# Patient Record
Sex: MALE | Race: WHITE | NOT HISPANIC OR LATINO | Employment: OTHER | URBAN - METROPOLITAN AREA
[De-identification: names, ages, dates, MRNs, and addresses within clinical notes are randomized per-mention and may not be internally consistent; named-entity substitution may affect disease eponyms.]

---

## 2023-01-05 ENCOUNTER — OFFICE VISIT (OUTPATIENT)
Dept: FAMILY MEDICINE | Facility: CLINIC | Age: 68
End: 2023-01-05
Payer: MEDICARE

## 2023-01-05 ENCOUNTER — LAB VISIT (OUTPATIENT)
Dept: LAB | Facility: HOSPITAL | Age: 68
End: 2023-01-05
Attending: FAMILY MEDICINE
Payer: MEDICARE

## 2023-01-05 VITALS
HEART RATE: 63 BPM | HEIGHT: 66 IN | DIASTOLIC BLOOD PRESSURE: 82 MMHG | TEMPERATURE: 98 F | SYSTOLIC BLOOD PRESSURE: 132 MMHG | OXYGEN SATURATION: 99 % | BODY MASS INDEX: 27.99 KG/M2 | WEIGHT: 174.19 LBS

## 2023-01-05 DIAGNOSIS — Z12.5 SCREENING PSA (PROSTATE SPECIFIC ANTIGEN): ICD-10-CM

## 2023-01-05 DIAGNOSIS — H60.92 OTITIS EXTERNA OF LEFT EAR, UNSPECIFIED CHRONICITY, UNSPECIFIED TYPE: ICD-10-CM

## 2023-01-05 DIAGNOSIS — H40.9 GLAUCOMA, UNSPECIFIED GLAUCOMA TYPE, UNSPECIFIED LATERALITY: ICD-10-CM

## 2023-01-05 DIAGNOSIS — Z13.6 ENCOUNTER FOR ABDOMINAL AORTIC ANEURYSM (AAA) SCREENING: ICD-10-CM

## 2023-01-05 DIAGNOSIS — M51.9 LUMBAR DISC DISEASE: ICD-10-CM

## 2023-01-05 DIAGNOSIS — N52.9 ERECTILE DYSFUNCTION, UNSPECIFIED ERECTILE DYSFUNCTION TYPE: ICD-10-CM

## 2023-01-05 DIAGNOSIS — Z98.890 HISTORY OF HAND SURGERY: ICD-10-CM

## 2023-01-05 DIAGNOSIS — F41.9 ANXIETY: ICD-10-CM

## 2023-01-05 DIAGNOSIS — I10 ESSENTIAL HYPERTENSION: Primary | ICD-10-CM

## 2023-01-05 DIAGNOSIS — L30.9 ECZEMA, UNSPECIFIED TYPE: ICD-10-CM

## 2023-01-05 DIAGNOSIS — E78.5 HYPERLIPIDEMIA, UNSPECIFIED HYPERLIPIDEMIA TYPE: ICD-10-CM

## 2023-01-05 DIAGNOSIS — I10 ESSENTIAL HYPERTENSION: ICD-10-CM

## 2023-01-05 LAB
ALBUMIN SERPL BCP-MCNC: 4.7 G/DL (ref 3.5–5.2)
ALP SERPL-CCNC: 38 U/L (ref 55–135)
ALT SERPL W/O P-5'-P-CCNC: 23 U/L (ref 10–44)
ANION GAP SERPL CALC-SCNC: 4 MMOL/L (ref 8–16)
AST SERPL-CCNC: 21 U/L (ref 10–40)
BASOPHILS # BLD AUTO: 0.03 K/UL (ref 0–0.2)
BASOPHILS NFR BLD: 0.6 % (ref 0–1.9)
BILIRUB SERPL-MCNC: 1.6 MG/DL (ref 0.1–1)
BUN SERPL-MCNC: 13 MG/DL (ref 8–23)
CALCIUM SERPL-MCNC: 9.2 MG/DL (ref 8.7–10.5)
CHLORIDE SERPL-SCNC: 105 MMOL/L (ref 95–110)
CHOLEST SERPL-MCNC: 258 MG/DL (ref 120–199)
CHOLEST/HDLC SERPL: 4.5 {RATIO} (ref 2–5)
CO2 SERPL-SCNC: 29 MMOL/L (ref 23–29)
COMPLEXED PSA SERPL-MCNC: 5.7 NG/ML (ref 0–4)
CREAT SERPL-MCNC: 0.8 MG/DL (ref 0.5–1.4)
DIFFERENTIAL METHOD: ABNORMAL
EOSINOPHIL # BLD AUTO: 0.1 K/UL (ref 0–0.5)
EOSINOPHIL NFR BLD: 2.2 % (ref 0–8)
ERYTHROCYTE [DISTWIDTH] IN BLOOD BY AUTOMATED COUNT: 12.3 % (ref 11.5–14.5)
EST. GFR  (NO RACE VARIABLE): >60 ML/MIN/1.73 M^2
GLUCOSE SERPL-MCNC: 101 MG/DL (ref 70–110)
HCT VFR BLD AUTO: 43.9 % (ref 40–54)
HDLC SERPL-MCNC: 57 MG/DL (ref 40–75)
HDLC SERPL: 22.1 % (ref 20–50)
HGB BLD-MCNC: 15 G/DL (ref 14–18)
IMM GRANULOCYTES # BLD AUTO: 0.02 K/UL (ref 0–0.04)
IMM GRANULOCYTES NFR BLD AUTO: 0.4 % (ref 0–0.5)
LDLC SERPL CALC-MCNC: 164.8 MG/DL (ref 63–159)
LYMPHOCYTES # BLD AUTO: 1.1 K/UL (ref 1–4.8)
LYMPHOCYTES NFR BLD: 21.1 % (ref 18–48)
MCH RBC QN AUTO: 30.9 PG (ref 27–31)
MCHC RBC AUTO-ENTMCNC: 34.2 G/DL (ref 32–36)
MCV RBC AUTO: 91 FL (ref 82–98)
MONOCYTES # BLD AUTO: 0.5 K/UL (ref 0.3–1)
MONOCYTES NFR BLD: 10.4 % (ref 4–15)
NEUTROPHILS # BLD AUTO: 3.3 K/UL (ref 1.8–7.7)
NEUTROPHILS NFR BLD: 65.3 % (ref 38–73)
NONHDLC SERPL-MCNC: 201 MG/DL
NRBC BLD-RTO: 0 /100 WBC
PLATELET # BLD AUTO: 199 K/UL (ref 150–450)
PMV BLD AUTO: 8.9 FL (ref 9.2–12.9)
POTASSIUM SERPL-SCNC: 4.5 MMOL/L (ref 3.5–5.1)
PROT SERPL-MCNC: 7.9 G/DL (ref 6–8.4)
RBC # BLD AUTO: 4.85 M/UL (ref 4.6–6.2)
SODIUM SERPL-SCNC: 138 MMOL/L (ref 136–145)
TRIGL SERPL-MCNC: 181 MG/DL (ref 30–150)
WBC # BLD AUTO: 5.02 K/UL (ref 3.9–12.7)

## 2023-01-05 PROCEDURE — G0008 ADMIN INFLUENZA VIRUS VAC: HCPCS | Mod: S$GLB,,, | Performed by: FAMILY MEDICINE

## 2023-01-05 PROCEDURE — 99204 OFFICE O/P NEW MOD 45 MIN: CPT | Mod: S$GLB,,, | Performed by: FAMILY MEDICINE

## 2023-01-05 PROCEDURE — 80061 LIPID PANEL: CPT | Performed by: FAMILY MEDICINE

## 2023-01-05 PROCEDURE — 90694 VACC AIIV4 NO PRSRV 0.5ML IM: CPT | Mod: S$GLB,,, | Performed by: FAMILY MEDICINE

## 2023-01-05 PROCEDURE — 85025 COMPLETE CBC W/AUTO DIFF WBC: CPT | Performed by: FAMILY MEDICINE

## 2023-01-05 PROCEDURE — 36415 COLL VENOUS BLD VENIPUNCTURE: CPT | Performed by: FAMILY MEDICINE

## 2023-01-05 PROCEDURE — 90694 FLU VACCINE - QUADRIVALENT - ADJUVANTED: ICD-10-PCS | Mod: S$GLB,,, | Performed by: FAMILY MEDICINE

## 2023-01-05 PROCEDURE — G0008 FLU VACCINE - QUADRIVALENT - ADJUVANTED: ICD-10-PCS | Mod: S$GLB,,, | Performed by: FAMILY MEDICINE

## 2023-01-05 PROCEDURE — 84153 ASSAY OF PSA TOTAL: CPT | Performed by: FAMILY MEDICINE

## 2023-01-05 PROCEDURE — 99204 PR OFFICE/OUTPT VISIT, NEW, LEVL IV, 45-59 MIN: ICD-10-PCS | Mod: S$GLB,,, | Performed by: FAMILY MEDICINE

## 2023-01-05 PROCEDURE — 80053 COMPREHEN METABOLIC PANEL: CPT | Performed by: FAMILY MEDICINE

## 2023-01-05 RX ORDER — LATANOPROST 50 UG/ML
1 SOLUTION/ DROPS OPHTHALMIC NIGHTLY
COMMUNITY
Start: 2022-12-30

## 2023-01-05 RX ORDER — ALPRAZOLAM 0.25 MG/1
TABLET ORAL
COMMUNITY
End: 2023-02-24 | Stop reason: SDUPTHER

## 2023-01-05 RX ORDER — LISINOPRIL 40 MG/1
40 TABLET ORAL DAILY
Qty: 90 TABLET | Refills: 1 | Status: SHIPPED | OUTPATIENT
Start: 2023-01-05 | End: 2023-06-06 | Stop reason: SDUPTHER

## 2023-01-05 RX ORDER — NEOMYCIN SULFATE, POLYMYXIN B SULFATE AND HYDROCORTISONE 10; 3.5; 1 MG/ML; MG/ML; [USP'U]/ML
3 SUSPENSION/ DROPS AURICULAR (OTIC) 4 TIMES DAILY
Qty: 10 ML | Refills: 0 | Status: ON HOLD | OUTPATIENT
Start: 2023-01-05 | End: 2023-04-25 | Stop reason: CLARIF

## 2023-01-05 RX ORDER — LISINOPRIL 40 MG/1
40 TABLET ORAL
COMMUNITY
Start: 2022-10-13 | End: 2023-01-05 | Stop reason: SDUPTHER

## 2023-01-05 RX ORDER — TRIAMCINOLONE ACETONIDE 1 MG/G
CREAM TOPICAL
COMMUNITY

## 2023-01-06 NOTE — PROGRESS NOTES
Subjective:       Patient ID: Phil Verduzco is a 67 y.o. male.    Chief Complaint: Establish Care and Annual Exam    Moved here from [place] in April.  First medical care here.      Social history quit smoking in 1994.  Alcohol 1/2 bottle wine per night.  Exercises 4 times a week.  Has worked at multiple different jobs in the past.      Family history father drowned at age 21.  Brother has osteoporosis.  Mother had cerebral aneurysm and stroke.  Several female family members in the family have had cerebral aneurysms.  None of the males.  Grandfather with hypertension.    Past medical history.  Hypertension controlled with lisinopril needs refill.  Erectile dysfunction p.r.n. tadalafil.  Glaucoma on eyedrops for this.  Some eczema for which she uses some triamcinolone cream p.r.n..  Anxiety issues uses p.r.n. Xanax for flying or scuba diving.  History of some hyperlipidemia.  Had right hand surgery on his thumb.  Has lumbar disc disease.    Immunizations due for flu shot high-dose.  Needs Prevnar 20.  He would like to research this before he gets it.      Review of Systems   Constitutional: Negative.    HENT:  Positive for ear pain.    Eyes: Negative.    Respiratory: Negative.     Cardiovascular: Negative.    Gastrointestinal: Negative.    Endocrine: Negative.    Genitourinary: Negative.    Musculoskeletal:  Positive for back pain.   Skin: Negative.    Neurological: Negative.    Hematological: Negative.    Psychiatric/Behavioral: Negative.       Objective:      Physical Exam  Vitals reviewed.   Constitutional:       Appearance: Normal appearance. He is well-developed and normal weight.   HENT:      Head: Normocephalic and atraumatic.      Right Ear: Tympanic membrane normal.      Left Ear: Tympanic membrane normal.      Ears:      Comments: Left ear canal is somewhat reddened.  Painful.     Nose: Nose normal.      Mouth/Throat:      Mouth: Mucous membranes are moist.      Pharynx: No oropharyngeal exudate.   Eyes:       Conjunctiva/sclera: Conjunctivae normal.      Pupils: Pupils are equal, round, and reactive to light.   Neck:      Vascular: No carotid bruit.   Cardiovascular:      Rate and Rhythm: Normal rate and regular rhythm.      Pulses: Normal pulses.      Heart sounds: Normal heart sounds. No murmur heard.    No gallop.   Pulmonary:      Effort: Pulmonary effort is normal.      Breath sounds: Normal breath sounds.   Abdominal:      General: Bowel sounds are normal.      Palpations: Abdomen is soft. There is no mass.      Tenderness: There is no abdominal tenderness.   Musculoskeletal:         General: Normal range of motion.      Cervical back: Normal range of motion.   Skin:     General: Skin is warm and dry.   Neurological:      General: No focal deficit present.      Mental Status: He is alert and oriented to person, place, and time.   Psychiatric:         Mood and Affect: Mood normal.         Behavior: Behavior normal.       Assessment:       1. Essential hypertension    2. Hyperlipidemia, unspecified hyperlipidemia type    3. Screening PSA (prostate specific antigen)    4. Encounter for abdominal aortic aneurysm (AAA) screening    5. Erectile dysfunction, unspecified erectile dysfunction type    6. Glaucoma, unspecified glaucoma type, unspecified laterality    7. Eczema, unspecified type    8. Anxiety    9. Lumbar disc disease    10. Otitis externa of left ear, unspecified chronicity, unspecified type    11. History of hand surgery          Plan:       Essential hypertension  -     CBC Auto Differential; Future; Expected date: 01/05/2023  -     Comprehensive Metabolic Panel; Future; Expected date: 01/05/2023    Hyperlipidemia, unspecified hyperlipidemia type  -     Lipid Panel; Future; Expected date: 01/05/2023    Screening PSA (prostate specific antigen)  -     PSA, Screening; Future; Expected date: 01/05/2023    Encounter for abdominal aortic aneurysm (AAA) screening  -      AAA Screening; Future; Expected  date: 01/05/2023    Erectile dysfunction, unspecified erectile dysfunction type    Glaucoma, unspecified glaucoma type, unspecified laterality    Eczema, unspecified type    Anxiety    Lumbar disc disease    Otitis externa of left ear, unspecified chronicity, unspecified type    History of hand surgery    Other orders  -     Influenza (FLUAD) - Quadrivalent (Adjuvanted) *Preferred* (65+) (PF)  -     neomycin-polymyxin-hydrocortisone (CORTISPORIN) 3.5-10,000-1 mg/mL-unit/mL-% otic suspension; Place 3 drops into the left ear 4 (four) times daily.  Dispense: 10 mL; Refill: 0  -     lisinopriL (PRINIVIL,ZESTRIL) 40 MG tablet; Take 1 tablet (40 mg total) by mouth once daily.  Dispense: 90 tablet; Refill: 1    High-dose flu vaccine today.  Consider Prevnar 20.  Refill lisinopril 40 mg daily.  Abdominal aortic aneurysm screening is ordered due to his history of prior smoking.  Colonoscopy recommended it is probably due he did have 1 in [place] but sounds like he was quite sometime ago.  CBC CMP lipids PSA ordered.    In addition to routine exam.  Having left ear pain.    Physical examination.  Left otitis externa.  Somewhat reddened not really tender.    Impression.  Left otitis externa.      Plan Cortisporin otic suspension.  Three drops q.i.d. to the left ear.

## 2023-01-09 ENCOUNTER — TELEPHONE (OUTPATIENT)
Dept: FAMILY MEDICINE | Facility: CLINIC | Age: 68
End: 2023-01-09
Payer: MEDICARE

## 2023-01-09 NOTE — TELEPHONE ENCOUNTER
----- Message from Carmelo Blanc sent at 1/9/2023  3:37 PM CST -----  Type: Needs Medical Advice  Who Called: Pt   Symptoms (please be specific):   How long has patient had these symptoms:    Pharmacy name and phone #:    Best Call Back Number: [phone]  Additional Information: Pt requesting a call back concerning why he has to do a U/S, pt states he does not recall speaking to Dr Gould about that.Also about records from [facility] in [place].

## 2023-01-10 ENCOUNTER — PATIENT MESSAGE (OUTPATIENT)
Dept: ADMINISTRATIVE | Facility: HOSPITAL | Age: 68
End: 2023-01-10
Payer: MEDICARE

## 2023-01-25 ENCOUNTER — HOSPITAL ENCOUNTER (OUTPATIENT)
Dept: RADIOLOGY | Facility: HOSPITAL | Age: 68
Discharge: HOME | End: 2023-01-25
Attending: FAMILY MEDICINE
Payer: MEDICARE

## 2023-01-25 DIAGNOSIS — Z13.6 ENCOUNTER FOR ABDOMINAL AORTIC ANEURYSM (AAA) SCREENING: ICD-10-CM

## 2023-01-25 PROCEDURE — 76706 US ABDL AORTA SCREEN AAA: CPT | Mod: TC,PO

## 2023-01-27 ENCOUNTER — TELEPHONE (OUTPATIENT)
Dept: FAMILY MEDICINE | Facility: CLINIC | Age: 68
End: 2023-01-27
Payer: MEDICARE

## 2023-01-27 NOTE — TELEPHONE ENCOUNTER
----- Message from Carmelo Blanc sent at 1/27/2023  9:35 AM CST -----  Type: Needs Medical Advice  Who Called: Pt   Symptoms (please be specific):   How long has patient had these symptoms:    Pharmacy name and phone #:    Best Call Back Number: [phone]  Additional Information: Pt requesting a call back concerning if records from [facility] was received in Dr Gould office.[facility] is located in [place], [place].

## 2023-01-31 ENCOUNTER — TELEPHONE (OUTPATIENT)
Dept: FAMILY MEDICINE | Facility: CLINIC | Age: 68
End: 2023-01-31
Payer: MEDICARE

## 2023-02-02 ENCOUNTER — TELEPHONE (OUTPATIENT)
Dept: FAMILY MEDICINE | Facility: CLINIC | Age: 68
End: 2023-02-02
Payer: MEDICARE

## 2023-02-02 NOTE — TELEPHONE ENCOUNTER
----- Message from Bart Taveras sent at 2/2/2023  3:46 PM CST -----  Type: Needs Medical Advice  Who Called:  Pt    Best Call Back Number: [phone]      Additional Information: Sts that they were able to link his mychart with his mychart with [place] since the records did not get transferred.  Hopes that Dr ortiz can access them that way to get up to speed with his history to better help him.  Please advise -- Thank you

## 2023-02-07 ENCOUNTER — TELEPHONE (OUTPATIENT)
Dept: FAMILY MEDICINE | Facility: CLINIC | Age: 68
End: 2023-02-07
Payer: MEDICARE

## 2023-02-15 ENCOUNTER — TELEPHONE (OUTPATIENT)
Dept: FAMILY MEDICINE | Facility: CLINIC | Age: 68
End: 2023-02-15
Payer: MEDICARE

## 2023-02-16 NOTE — TELEPHONE ENCOUNTER
Returned call to pt , states he was on vacation a week ago had sinus col got better then had diarrhea when he got home all clear now home test was neg for covid . He also stated once last week he had little blood in semen hasnt happened again. Told pt keep his apt next week if any symp or problem arise call and we will get him in or after hours urgent care or er.

## 2023-02-16 NOTE — TELEPHONE ENCOUNTER
----- Message from Carlitos Forrest sent at 2/15/2023  3:28 PM CST -----  Regarding: qustion  Type: Needs Medical Advice    Who Called:  Phil    B[facility]ba Call Back Number: [phone]    Additional Information: pt was ill last night wants to know if needs to quarantine but did not take home test. Pt had incident while on vacation. Needs to know if needs to be seen sooner than upcoming appt.

## 2023-02-24 ENCOUNTER — OFFICE VISIT (OUTPATIENT)
Dept: FAMILY MEDICINE | Facility: CLINIC | Age: 68
End: 2023-02-24
Payer: MEDICARE

## 2023-02-24 VITALS
HEART RATE: 79 BPM | HEIGHT: 66 IN | SYSTOLIC BLOOD PRESSURE: 120 MMHG | DIASTOLIC BLOOD PRESSURE: 64 MMHG | TEMPERATURE: 98 F | OXYGEN SATURATION: 97 % | WEIGHT: 176.63 LBS | BODY MASS INDEX: 28.39 KG/M2

## 2023-02-24 DIAGNOSIS — R36.1 HEMATOSPERMIA: ICD-10-CM

## 2023-02-24 DIAGNOSIS — F41.9 ANXIETY: ICD-10-CM

## 2023-02-24 DIAGNOSIS — Z77.090 ASBESTOS EXPOSURE: ICD-10-CM

## 2023-02-24 DIAGNOSIS — J92.9 PLEURAL PLAQUE: ICD-10-CM

## 2023-02-24 DIAGNOSIS — Z12.11 COLON CANCER SCREENING: ICD-10-CM

## 2023-02-24 DIAGNOSIS — N41.9 PROSTATITIS, UNSPECIFIED PROSTATITIS TYPE: ICD-10-CM

## 2023-02-24 DIAGNOSIS — M54.9 BACK PAIN, UNSPECIFIED BACK LOCATION, UNSPECIFIED BACK PAIN LATERALITY, UNSPECIFIED CHRONICITY: Primary | ICD-10-CM

## 2023-02-24 DIAGNOSIS — R97.20 ELEVATED PSA: ICD-10-CM

## 2023-02-24 LAB
BILIRUBIN, UA POC OHS: NEGATIVE
BLOOD, UA POC OHS: ABNORMAL
CLARITY, UA POC OHS: CLEAR
COLOR, UA POC OHS: YELLOW
GLUCOSE, UA POC OHS: NEGATIVE
KETONES, UA POC OHS: NEGATIVE
LEUKOCYTES, UA POC OHS: NEGATIVE
NITRITE, UA POC OHS: NEGATIVE
PH, UA POC OHS: 6.5
PROTEIN, UA POC OHS: NEGATIVE
SPECIFIC GRAVITY, UA POC OHS: 1.01
UROBILINOGEN, UA POC OHS: 0.2

## 2023-02-24 PROCEDURE — 81003 URINALYSIS AUTO W/O SCOPE: CPT | Mod: QW,S$GLB,, | Performed by: FAMILY MEDICINE

## 2023-02-24 PROCEDURE — 99214 PR OFFICE/OUTPT VISIT, EST, LEVL IV, 30-39 MIN: ICD-10-PCS | Mod: S$GLB,,, | Performed by: FAMILY MEDICINE

## 2023-02-24 PROCEDURE — 99214 OFFICE O/P EST MOD 30 MIN: CPT | Mod: S$GLB,,, | Performed by: FAMILY MEDICINE

## 2023-02-24 PROCEDURE — 81003 POCT URINALYSIS(INSTRUMENT): ICD-10-PCS | Mod: QW,S$GLB,, | Performed by: FAMILY MEDICINE

## 2023-02-24 RX ORDER — CIPROFLOXACIN 500 MG/1
500 TABLET ORAL 2 TIMES DAILY
Qty: 30 TABLET | Refills: 0 | Status: ON HOLD | OUTPATIENT
Start: 2023-02-24 | End: 2023-04-25 | Stop reason: CLARIF

## 2023-02-24 RX ORDER — ALPRAZOLAM 0.5 MG/1
0.5 TABLET ORAL DAILY PRN
Qty: 30 TABLET | Refills: 0 | Status: SHIPPED | OUTPATIENT
Start: 2023-02-24

## 2023-02-24 NOTE — PATIENT INSTRUCTIONS
UROLOGY   Thad Patricio MD  [address] [place] [address] DR  [address]  [place] [address]  Phone: [phone]  Fax: [phone]    GASTRO FOR COLONOSCOPY   Gerard Allen MD  [address] [facility]  [address]  [place] [address]  Phone: [phone]  Fax: [phone]    Ct CHEST - [facility] WILL CALL YOU TO SCHEDULE

## 2023-02-26 PROBLEM — Z77.090 ASBESTOS EXPOSURE: Status: ACTIVE | Noted: 2023-02-26

## 2023-02-27 ENCOUNTER — TELEPHONE (OUTPATIENT)
Dept: GASTROENTEROLOGY | Facility: CLINIC | Age: 68
End: 2023-02-27
Payer: MEDICARE

## 2023-02-27 ENCOUNTER — TELEPHONE (OUTPATIENT)
Dept: FAMILY MEDICINE | Facility: CLINIC | Age: 68
End: 2023-02-27
Payer: MEDICARE

## 2023-02-27 DIAGNOSIS — Z86.010 HISTORY OF COLON POLYPS: Primary | ICD-10-CM

## 2023-02-27 NOTE — PROGRESS NOTES
Subjective:       Patient ID: Phil Verduzco is a 67 y.o. male.    Chief Complaint: Urinary Tract Infection    Worried about urinary tract infection.  Had some blood in the semen about 2 weeks ago.  Slight pressure and testicles.  No dysuria no frequency.  Nocturia 2 times per night.  PSA 5.7 previously 3.73 and 4.43.  No fever chills.  Urinalysis shows trace lysed blood only.  Also concern over prior asbestos exposure.  History of a pleural plaque.  He had fallen previously and had a rib fracture.  Plaque was found.  Had a CT scan March 30, 2017.  Reviewed the old doctor's note from that that we could not see the CT report and he was supposed heavy repeat CT in 1 year this was never done.  He has no shortness of breath.  Elevated PSA so will need to see urology.  He is also due for colon cancer screening.  Anxiety issues.  Needs Xanax p.r.n. for flying.  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     Physical examination.  Vital signs are noted.  No acute distress.  Neck without bruit no adenopathy.  Chest clear.  Heart regular rate and rhythm.  Abdomen bowel sounds are positive soft nontender no hepatosplenomegaly no CVA tenderness.   testes descended nontender no masses.  No hernia.  Rectal exam prostate is smooth soft not really tender but is somewhat boggy.      Objective:        Assessment:       1. Back pain, unspecified back location, unspecified back pain laterality, unspecified chronicity    2. Hematospermia    3. Prostatitis, unspecified prostatitis type    4. Asbestos exposure    5. Elevated PSA    6. Colon cancer screening    7. Anxiety    8. Pleural plaque          Plan:       Back pain, unspecified back location, unspecified back pain laterality, unspecified chronicity  -     POCT Urinalysis(Instrument)    Hematospermia    Prostatitis, unspecified prostatitis type  -     Ambulatory referral/consult to Urology; Future; Expected date: 03/03/2023    Asbestos exposure  -     CT Chest Without Contrast; Future; Expected  date: 02/24/2023    Elevated PSA  -     Ambulatory referral/consult to Urology; Future; Expected date: 03/03/2023    Colon cancer screening  -     Cancel: Ambulatory referral/consult to Gastroenterology; Future; Expected date: 03/03/2023  -     Ambulatory referral/consult to Gastroenterology; Future; Expected date: 03/03/2023    Anxiety    Pleural plaque    Other orders  -     ciprofloxacin HCl (CIPRO) 500 MG tablet; Take 1 tablet (500 mg total) by mouth 2 (two) times daily.  Dispense: 30 tablet; Refill: 0  -     ALPRAZolam (XANAX) 0.5 MG tablet; Take 1 tablet (0.5 mg total) by mouth daily as needed for Anxiety.  Dispense: 30 tablet; Refill: 0    Treat him with antibiotics for prostatitis.  Refer him to Urology Dr. Patricio.  Cipro 500 mg b.i.d..  CT chest without contrast due to the asbestos in the pleural plaques.  Refer him for colonoscopy.  Xanax 0.5 number 30 daily maximum p.r.n. for flying.

## 2023-02-28 ENCOUNTER — PATIENT MESSAGE (OUTPATIENT)
Dept: FAMILY MEDICINE | Facility: CLINIC | Age: 68
End: 2023-02-28
Payer: MEDICARE

## 2023-03-01 ENCOUNTER — HOSPITAL ENCOUNTER (OUTPATIENT)
Dept: RADIOLOGY | Facility: HOSPITAL | Age: 68
Discharge: HOME | End: 2023-03-01
Attending: FAMILY MEDICINE
Payer: MEDICARE

## 2023-03-01 DIAGNOSIS — Z77.090 ASBESTOS EXPOSURE: ICD-10-CM

## 2023-03-01 PROCEDURE — 71250 CT THORAX DX C-: CPT | Mod: TC,PO

## 2023-03-02 ENCOUNTER — TELEPHONE (OUTPATIENT)
Dept: FAMILY MEDICINE | Facility: CLINIC | Age: 68
End: 2023-03-02
Payer: MEDICARE

## 2023-03-02 NOTE — TELEPHONE ENCOUNTER
We received Imaging results xray chest, ct chest with contrast,and ct head cervical spine without contrast.

## 2023-03-03 ENCOUNTER — PATIENT MESSAGE (OUTPATIENT)
Dept: FAMILY MEDICINE | Facility: CLINIC | Age: 68
End: 2023-03-03
Payer: MEDICARE

## 2023-03-03 NOTE — TELEPHONE ENCOUNTER
The comparison shows no worrisome changes.  As Dr. Gould stated on his most recent imaging there is nothing that is suspicious for cancer. Follow up as recommended.

## 2023-03-16 ENCOUNTER — TELEPHONE (OUTPATIENT)
Dept: UROLOGY | Facility: CLINIC | Age: 68
End: 2023-03-16
Payer: MEDICARE

## 2023-03-16 NOTE — TELEPHONE ENCOUNTER
Pre appt call  Inquired about pts past urological care pt voiced sen urologist but was in [place]   Per pt linked in care everywhere per pt does not remember MD name   Confirmed appt time and reminded will need urine upon arrival for appt   Pt vu

## 2023-03-17 ENCOUNTER — OFFICE VISIT (OUTPATIENT)
Dept: UROLOGY | Facility: CLINIC | Age: 68
End: 2023-03-17
Payer: MEDICARE

## 2023-03-17 VITALS
SYSTOLIC BLOOD PRESSURE: 139 MMHG | BODY MASS INDEX: 28.5 KG/M2 | HEIGHT: 66 IN | RESPIRATION RATE: 18 BRPM | HEART RATE: 93 BPM | DIASTOLIC BLOOD PRESSURE: 77 MMHG

## 2023-03-17 DIAGNOSIS — R97.20 ELEVATED PSA: Primary | ICD-10-CM

## 2023-03-17 DIAGNOSIS — R31.29 MICROHEMATURIA: ICD-10-CM

## 2023-03-17 DIAGNOSIS — N41.9 PROSTATITIS, UNSPECIFIED PROSTATITIS TYPE: ICD-10-CM

## 2023-03-17 LAB
BACTERIA #/AREA URNS HPF: ABNORMAL /HPF
BACTERIA #/AREA URNS HPF: ABNORMAL /HPF
BILIRUB UR QL STRIP: NEGATIVE
BILIRUBIN, UA POC OHS: NEGATIVE
BLOOD, UA POC OHS: ABNORMAL
CLARITY UR: ABNORMAL
CLARITY, UA POC OHS: CLEAR
COLOR UR: YELLOW
COLOR, UA POC OHS: YELLOW
GLUCOSE UR QL STRIP: NEGATIVE
GLUCOSE, UA POC OHS: NEGATIVE
HGB UR QL STRIP: ABNORMAL
KETONES UR QL STRIP: NEGATIVE
KETONES, UA POC OHS: NEGATIVE
LEUKOCYTE ESTERASE UR QL STRIP: NEGATIVE
LEUKOCYTES, UA POC OHS: NEGATIVE
MICROSCOPIC COMMENT: ABNORMAL
MICROSCOPIC COMMENT: ABNORMAL
NITRITE UR QL STRIP: NEGATIVE
NITRITE, UA POC OHS: NEGATIVE
PH UR STRIP: 5 [PH] (ref 5–8)
PH, UA POC OHS: 5.5
POC RESIDUAL URINE VOLUME: 3 ML (ref 0–100)
PROT UR QL STRIP: NEGATIVE
PROTEIN, UA POC OHS: NEGATIVE
RBC #/AREA URNS HPF: 4 /HPF (ref 0–4)
RBC #/AREA URNS HPF: 4 /HPF (ref 0–4)
SP GR UR STRIP: 1.01 (ref 1–1.03)
SPECIFIC GRAVITY, UA POC OHS: 1.02
URN SPEC COLLECT METH UR: ABNORMAL
UROBILINOGEN UR STRIP-ACNC: NEGATIVE EU/DL
UROBILINOGEN, UA POC OHS: 0.2

## 2023-03-17 PROCEDURE — 99999 PR PBB SHADOW E&M-EST. PATIENT-LVL IV: CPT | Mod: PBBFAC,,, | Performed by: UROLOGY

## 2023-03-17 PROCEDURE — 87086 URINE CULTURE/COLONY COUNT: CPT | Performed by: UROLOGY

## 2023-03-17 PROCEDURE — 99214 OFFICE O/P EST MOD 30 MIN: CPT | Mod: PBBFAC,PN | Performed by: UROLOGY

## 2023-03-17 PROCEDURE — 99999 PR PBB SHADOW E&M-EST. PATIENT-LVL IV: ICD-10-PCS | Mod: PBBFAC,,, | Performed by: UROLOGY

## 2023-03-17 PROCEDURE — 81003 URINALYSIS AUTO W/O SCOPE: CPT | Performed by: UROLOGY

## 2023-03-17 PROCEDURE — 51798 US URINE CAPACITY MEASURE: CPT | Mod: PBBFAC,PN | Performed by: UROLOGY

## 2023-03-17 PROCEDURE — 81003 URINALYSIS AUTO W/O SCOPE: CPT | Mod: PBBFAC,PN | Performed by: UROLOGY

## 2023-03-17 PROCEDURE — 99205 PR OFFICE/OUTPT VISIT, NEW, LEVL V, 60-74 MIN: ICD-10-PCS | Mod: S$PBB,,, | Performed by: UROLOGY

## 2023-03-17 PROCEDURE — 99205 OFFICE O/P NEW HI 60 MIN: CPT | Mod: S$PBB,,, | Performed by: UROLOGY

## 2023-03-17 PROCEDURE — 81000 URINALYSIS NONAUTO W/SCOPE: CPT | Mod: 91 | Performed by: UROLOGY

## 2023-03-17 RX ORDER — CIPROFLOXACIN 500 MG/1
500 TABLET ORAL 2 TIMES DAILY
Qty: 6 TABLET | Refills: 0 | Status: SHIPPED | OUTPATIENT
Start: 2023-03-17 | End: 2023-06-07

## 2023-03-17 NOTE — PROGRESS NOTES
[facility] Urology New Patient/H&P:     Phil Verduzco is a 67 y.o. male who presents For evaluation of prostatitis elevated psa    2/24/23 [facility]  Worried about urinary tract infection.  Had some blood in the semen about 2 weeks ago.  Slight pressure and testicles.  No dysuria no frequency.  Nocturia 2 times per night.  PSA 5.7 previously 3.73 and 4.43.  No fever chills.  Urinalysis shows trace lysed blood only.  Also concern over prior asbestos exposure.  History of a pleural plaque.  He had fallen previously and had a rib fracture.  Plaque was found.  Had a CT scan March 30, 2017.  Reviewed the old doctor's note from that that we could not see the CT report and he was supposed heavy repeat CT in 1 year this was never done.  He has no shortness of breath.  Elevated PSA so will need to see urology.  He is also due for colon cancer screening.  Anxiety issues.  Needs Xanax p.r.n. for flying. Has hepatic cyst.   Physical examination noted  testes descended nontender no masses.  No hernia.  Rectal exam prostate is smooth soft not really tender but is somewhat boggy.  Given 15 day course cipro.    He presents today noting  [place] 3 yrs ago last saw urology he thinks. Not sure who. May have been about psa. Linked his mychart to [place] [facility]. CE review notes only pcp and GI visits.   Care Every where psa review however notes prior elevations    Component 06/09/2021 06/09/2021 08/11/2020 01/03/2017          PSA 3.73 -- 4.43 High     3.23   PSA, Free -- 0.49 -- --   PSA, Free Pct -- 13.1 Low     -- --     PSA on file here was 5.7 on 1/7/23 which was about 1 month before his hematospermia and visit above and was aysmp  He reports that the blood in his semen was painless, lasted 1 day, resolved on its own.  Denies blood in urine  No blood thinners.  Nonsmoker.  Quit 1994  No known family history of  malignancy  Minimal to no bothersome obstructive lower urinary tract symptoms.  Only assigned 2. Some AUA symptom score  "items, with narrative on others noting that incomplete emptying is only sometimes at night, frequency is only after hydrating and 2 coffees in the morning, intermittency is only once at night.  He notes 0/5 for urgency, weak stream and straining.  He is only happened a public restrooms.  He has nocturia x1, and does drink when he gets up at night to urinate.   cipro x15 days. Just finished 5 days ago.  Denies testicular ejaculatory perineal perirectal pain or pressure at time of evaluation and starting antibiotics.  No change since taking them.  Relatively asymptomatic.  Urinalysis dipstick today has moderate blood.  PVR by bladder scan is 3 cc.      Past Medical History:   Diagnosis Date    Hypertension     Mixed hyperlipidemia        Past Surgical History:   Procedure Laterality Date    right hand surgery         No family history on file.    Social History     Socioeconomic History    Marital status:    Tobacco Use    Smoking status: Former     Types: Cigarettes     Quit date:      Years since quittin.2    Smokeless tobacco: Former     Types: Chew     Quit date:        Review of patient's allergies indicates:  No Known Allergies    Medications Reviewed: see MAR    Focused Physical Exam    Vitals:    23 1044   BP: 139/77   Pulse: 93   Resp: 18     Body mass index is 28.5 kg/m².   Height: 5' 6" (167.6 cm)       Abdomen: Soft, non-tender, nondistended, no CVA tenderness  :  normal phallus without plaques/lesions, orthotopic urethral meatus, bilaterally desc testes in normal scrotum without mass or lesion  MARKO: normal sphincter tone, no masses, no hemmorrhoids   PROSTATE: 35g, no nodules, non-tender, nonboggy though questionably firmer on left than right which may have accounted for concern of soft consistency of right lobe.       LABS:      Recent Results (from the past 336 hour(s))   POCT Urinalysis(Instrument)    Collection Time: 23 10:56 AM   Result Value Ref Range    Color, " POC UA Yellow Yellow, Straw, Colorless    Clarity, POC UA Clear Clear    Glucose, POC UA Negative Negative    Bilirubin, POC UA Negative Negative    Ketones, POC UA Negative Negative    Spec Grav POC UA 1.020 1.005 - 1.030    Blood, POC UA Moderate (A) Negative    pH, POC UA 5.5 5.0 - 8.0    Protein, POC UA Negative Negative    Urobilinogen, POC UA 0.2 <=1.0    Nitrite, POC UA Negative Negative    WBC, POC UA Negative Negative   POCT Bladder Scan    Collection Time: 03/17/23 10:56 AM   Result Value Ref Range    POC Residual Urine Volume 3 0 - 100 mL         Assessment/Diagnosis:    1. Elevated PSA  Ambulatory referral/consult to Urology    PSA, Total and Free    Creatinine, Serum    MRI Prostate W W/O Contrast    Case Request Operating Room: BIOPSY, PROSTATE      2. Prostatitis, unspecified prostatitis type  Ambulatory referral/consult to Urology    POCT Urinalysis(Instrument)    POCT Bladder Scan    Urinalysis    Urinalysis Microscopic    Urine culture    Case Request Operating Room: BIOPSY, PROSTATE      3. Microhematuria            Plans:  We did review that hematospermia is often benign and self-limiting.  May have been related to prostate inflammation.  However in 2% or less of cases can be indication of prostate cancer.  We did review his history of elevated PSA as well as PSA elevation prior to any hematospermia concerns for prostatitis which he may or may not have had.  He has completed a course of antibiotics, based on blood in semen, questionably boggy prostate on exam, and vague symptoms.  He largely denies the symptoms today except for the self-limiting hematospermia in the absence of hematuria.    As well, his PSA elevation 1 month prior to that episode, is not the only PSA elevation, and on review of labs in Care everywhere has had PSA elevation in August of 2020 greater than 4, and though recheck the following year was less than 4, free PSA was still quite low at 12%.    I had a long discussion with  the patient regarding the natural history of cancer in men as well as when diagnostics are indicated. We also discussed differential for elevated psa which also includes benign enlargement and prostatitis.  We did discuss that an elevated PSA is considered a PSA greater than 4 because statistically 20% of people in this value range are found to have prostate cancer, however we also discussed a bit about PSA velocity and trends and age specific psa elevations. Given his true elevations, I therefore recommended prostate biopsy to evaluate for underlying malignancy.  We discussed biopsy and in detail, including 1% risk of infectious complications including sepsis but that it is an otherwise safe diagnostic procedure with expected hematuria hematospermia after.      I went over the details of a transrectal ultrasound-guided biopsy of the prostate, and described the technique in detail.   The patient will be given local injection anesthetic to block the prostate so as to minimize any pain. 12-14 biopsy specimens will be taken. These will be sent for histopathology analysis.   Complications include bleeding, fever and chills. He was also instructed to watch for any signs of fever. If he does have any fever or chills after, he was advised to come to the emergency room right away for intravenous antibiotics and possible admission to the hospital. He is to refrain from any strenuous activity including sexual activity for the next 72 hours after biopsy. He was also advised that he may have blood while urinating, during bowel movements as well as during ejaculations. He was given a prebiopsy/postbiopsy instruction sheet was reminding him to avoid aspirin and blood thinners for 7 days prior, take the Rxed antibiotics the day before, day of, day after biopsy, and perform a fleet enema at home morning of biopsy. All questions he had were answered in detail.      As well, prior to proceeding with prostate biopsy, will recheck PSA  with free and total PSA at least a few weeks after completion of his cipro as well as add free PSA to help further assess risk.  Discuss the significance of free PSA in helping to risk stratify concern for underlying disease.  We also had risk benefit discussion about MRI of the prostate noting it is recommended prior to proceeding with biopsy to determine if there are any clinically significant index lesions, which could then be targeted on MRI.  Discussed briefly cognitive versus MRI fusion.  We also reviewed that a negative MRI does not rule out disease, and biopsy still indicated.     We did also review his recent trace blood on urine dipstick at primary Care, and moderate blood on urine dipstick today.  We did discuss that micro hematuria is greater than 5 red blood cells per high-power field on urinalysis microscopic exam, which will sent today as well as culture.  Certainly if true micro hematuria present, can add on cystoscopy at time of prostate biopsy and briefly discuss this procedure, as well as get upper tract imaging prior.  Will chart check urine results to determine if this workup is necessary, otherwise     TRUS/bx scheduled at [facility] on 4/25/23  MRI of the prostate with PSA free and total and creatinine in the next 2-3 weeks.        Total time spent in/on encounter today, including face to face time with patient, counseling, medical record review, interpretation of tests/results, , and treatment plan coordination: 70 minutes

## 2023-03-19 LAB — BACTERIA UR CULT: NO GROWTH

## 2023-03-21 ENCOUNTER — TELEPHONE (OUTPATIENT)
Dept: FAMILY MEDICINE | Facility: CLINIC | Age: 68
End: 2023-03-21
Payer: MEDICARE

## 2023-03-21 NOTE — TELEPHONE ENCOUNTER
----- Message from Carlitos Forrest sent at 3/21/2023 10:41 AM CDT -----  Regarding: f/u on portal msg  Type: Needs Medical Advice    Who Called:  Phil Mac Call Back Number: [phone]    Additional Information: please call pt abt question in portal msg dated 3/19 and 3/20

## 2023-03-23 ENCOUNTER — PATIENT MESSAGE (OUTPATIENT)
Dept: FAMILY MEDICINE | Facility: CLINIC | Age: 68
End: 2023-03-23
Payer: MEDICARE

## 2023-03-23 ENCOUNTER — NURSE TRIAGE (OUTPATIENT)
Dept: ADMINISTRATIVE | Facility: CLINIC | Age: 68
End: 2023-03-23
Payer: MEDICARE

## 2023-03-23 ENCOUNTER — TELEPHONE (OUTPATIENT)
Dept: FAMILY MEDICINE | Facility: CLINIC | Age: 68
End: 2023-03-23
Payer: MEDICARE

## 2023-03-23 NOTE — TELEPHONE ENCOUNTER
----- Message from Serene Palma sent at 3/23/2023 11:22 AM CDT -----  Contact: [phone]  Type: Needs Medical Advice  Who Called:  Pt     Best Call Back Number: [phone]    Additional Information: Pt stated he is upset no one has called him back regarding his mychart message about his RectiCare. Pls call back and advise.

## 2023-03-23 NOTE — TELEPHONE ENCOUNTER
"Pt calling regarding his message on 3/20 to providers office. "I bought some RectiCare cream for anal itching. It says to talk to my Doctor 1st before using if you are taking blood pressure medicine."   He is trying to get information on if he may use this medication this evening. He would like follow up through the portal if possible in this regard as he will not be able to answer his phone.     Reason for Disposition   Nursing judgment    Protocols used: No Protocol [id number]    "

## 2023-04-05 ENCOUNTER — HOSPITAL ENCOUNTER (OUTPATIENT)
Dept: RADIOLOGY | Facility: HOSPITAL | Age: 68
Discharge: HOME | End: 2023-04-05
Attending: UROLOGY
Payer: MEDICARE

## 2023-04-05 DIAGNOSIS — R97.20 ELEVATED PSA: ICD-10-CM

## 2023-04-05 PROCEDURE — 25500020 PHARM REV CODE 255: Performed by: UROLOGY

## 2023-04-05 PROCEDURE — 72197 MRI PELVIS W/O & W/DYE: CPT | Mod: 26,,, | Performed by: RADIOLOGY

## 2023-04-05 PROCEDURE — A9585 GADOBUTROL INJECTION: HCPCS | Performed by: UROLOGY

## 2023-04-05 PROCEDURE — 72197 MRI PROSTATE W W/O CONTRAST: ICD-10-PCS | Mod: 26,,, | Performed by: RADIOLOGY

## 2023-04-05 PROCEDURE — 72197 MRI PELVIS W/O & W/DYE: CPT | Mod: TC

## 2023-04-05 RX ORDER — GADOBUTROL 604.72 MG/ML
8 INJECTION INTRAVENOUS
Status: COMPLETED | OUTPATIENT
Start: 2023-04-05 | End: 2023-04-05

## 2023-04-05 RX ADMIN — GADOBUTROL 8 ML: 604.72 INJECTION INTRAVENOUS at 08:04

## 2023-04-24 ENCOUNTER — TELEPHONE (OUTPATIENT)
Dept: UROLOGY | Facility: CLINIC | Age: 68
End: 2023-04-24
Payer: MEDICARE

## 2023-04-24 NOTE — TELEPHONE ENCOUNTER
----- Message from Cathy Kaye sent at 4/24/2023 10:45 AM CDT -----  Contact: self  Type:  Needs Medical Advice    Who Called: Pt  Symptoms (please be specific): Pre-Op Instructions regarding Medicine      Would the patient rather a call back or a response via [facility]? call  Best Call Back Number: [phone]     Additional Information: Pt took TADALAFIL ORAL, 10:20am today, would like to know if this will affect him getting procedure on tomorrow... Please call to advise... Thank you...

## 2023-04-24 NOTE — TELEPHONE ENCOUNTER
Spoke w pt he called voicing he took tadafil and would like to know if it will affect his prostate biopsy tomm   Pt informed will inquire with MD and call back   Pt vu

## 2023-04-25 ENCOUNTER — HOSPITAL ENCOUNTER (OUTPATIENT)
Facility: AMBULARY SURGERY CENTER | Age: 68
Discharge: HOME | End: 2023-04-25
Attending: UROLOGY | Admitting: UROLOGY
Payer: MEDICARE

## 2023-04-25 DIAGNOSIS — R97.20 ELEVATED PSA: Primary | ICD-10-CM

## 2023-04-25 DIAGNOSIS — N22 CALCULUS OF URINARY TRACT IN DISEASES CLASSIFIED ELSEWHERE: ICD-10-CM

## 2023-04-25 PROCEDURE — 88342 CHG IMMUNOCYTOCHEMISTRY: ICD-10-PCS | Mod: 26,,, | Performed by: STUDENT IN AN ORGANIZED HEALTH CARE EDUCATION/TRAINING PROGRAM

## 2023-04-25 PROCEDURE — 76872 US TRANSRECTAL: CPT | Mod: 26,,, | Performed by: UROLOGY

## 2023-04-25 PROCEDURE — 88341 PR IHC OR ICC EACH ADD'L SINGLE ANTIBODY  STAINPR: ICD-10-PCS | Mod: 26,,, | Performed by: STUDENT IN AN ORGANIZED HEALTH CARE EDUCATION/TRAINING PROGRAM

## 2023-04-25 PROCEDURE — G0416 PROSTATE BIOPSY, ANY MTHD: HCPCS | Mod: 26,,, | Performed by: STUDENT IN AN ORGANIZED HEALTH CARE EDUCATION/TRAINING PROGRAM

## 2023-04-25 PROCEDURE — 55700 PR BIOPSY OF PROSTATE,NEEDLE/PUNCH: ICD-10-PCS | Mod: ,,, | Performed by: UROLOGY

## 2023-04-25 PROCEDURE — 76872 PR US TRANSRECTAL: ICD-10-PCS | Mod: 26,,, | Performed by: UROLOGY

## 2023-04-25 PROCEDURE — G0416 PROSTATE BIOPSY, ANY MTHD: ICD-10-PCS | Mod: 26,,, | Performed by: STUDENT IN AN ORGANIZED HEALTH CARE EDUCATION/TRAINING PROGRAM

## 2023-04-25 PROCEDURE — 55700 PR BIOPSY OF PROSTATE,NEEDLE/PUNCH: CPT | Mod: ,,, | Performed by: UROLOGY

## 2023-04-25 PROCEDURE — 55700 HC PROSTATE NEEDLE BIOPSY: CPT | Performed by: UROLOGY

## 2023-04-25 PROCEDURE — 88342 IMHCHEM/IMCYTCHM 1ST ANTB: CPT | Mod: 26,,, | Performed by: STUDENT IN AN ORGANIZED HEALTH CARE EDUCATION/TRAINING PROGRAM

## 2023-04-25 PROCEDURE — 76872 US TRANSRECTAL: CPT | Performed by: UROLOGY

## 2023-04-25 PROCEDURE — 88341 IMHCHEM/IMCYTCHM EA ADD ANTB: CPT | Mod: 26,,, | Performed by: STUDENT IN AN ORGANIZED HEALTH CARE EDUCATION/TRAINING PROGRAM

## 2023-04-25 RX ORDER — GENTAMICIN SULFATE 40 MG/ML
80 INJECTION, SOLUTION INTRAMUSCULAR; INTRAVENOUS ONCE
Status: COMPLETED | OUTPATIENT
Start: 2023-04-25 | End: 2023-04-25

## 2023-04-25 RX ORDER — GENTAMICIN SULFATE 40 MG/ML
INJECTION, SOLUTION INTRAMUSCULAR; INTRAVENOUS
Status: COMPLETED
Start: 2023-04-25 | End: 2023-04-25

## 2023-04-25 RX ORDER — LIDOCAINE HYDROCHLORIDE 10 MG/ML
INJECTION, SOLUTION EPIDURAL; INFILTRATION; INTRACAUDAL; PERINEURAL
Status: DISCONTINUED | OUTPATIENT
Start: 2023-04-25 | End: 2023-04-25 | Stop reason: HOSPADM

## 2023-04-25 RX ADMIN — GENTAMICIN SULFATE 80 MG: 40 INJECTION, SOLUTION INTRAMUSCULAR; INTRAVENOUS at 11:04

## 2023-04-25 NOTE — H&P
[facility] New Patient/H&P:      Phil Verduzco is a 67 y.o. male who presents For evaluation of prostatitis elevated psa     2/24/23 [facility]  Worried about urinary tract infection.  Had some blood in the semen about 2 weeks ago.  Slight pressure and testicles.  No dysuria no frequency.  Nocturia 2 times per night.  PSA 5.7 previously 3.73 and 4.43.  No fever chills.  Urinalysis shows trace lysed blood only.  Also concern over prior asbestos exposure.  History of a pleural plaque.  He had fallen previously and had a rib fracture.  Plaque was found.  Had a CT scan March 30, 2017.  Reviewed the old doctor's note from that that we could not see the CT report and he was supposed heavy repeat CT in 1 year this was never done.  He has no shortness of breath.  Elevated PSA so will need to see urology.  He is also due for colon cancer screening.  Anxiety issues.  Needs Xanax p.r.n. for flying. Has hepatic cyst.   Physical examination noted  testes descended nontender no masses.  No hernia.  Rectal exam prostate is smooth soft not really tender but is somewhat boggy.  Given 15 day course cipro.     He presents today noting  [place] 3 yrs ago last saw urology he thinks. Not sure who. May have been about psa. Linked his mychart to [facility] system. CE review notes only pcp and GI visits.   Care Every where psa review however notes prior elevations     Component 06/09/2021 06/09/2021 08/11/2020 01/03/2017               PSA 3.73 -- 4.43 High     3.23   PSA, Free -- 0.49 -- --   PSA, Free Pct -- 13.1 Low     -- --      PSA on file here was 5.7 on 1/7/23 which was about 1 month before his hematospermia and visit above and was aysmp  He reports that the blood in his semen was painless, lasted 1 day, resolved on its own.  Denies blood in urine  No blood thinners.  Nonsmoker.  Quit 1994  No known family history of  malignancy  Minimal to no bothersome obstructive lower urinary tract symptoms.  Only assigned 2. Some AUA  "symptom score items, with narrative on others noting that incomplete emptying is only sometimes at night, frequency is only after hydrating and 2 coffees in the morning, intermittency is only once at night.  He notes 0/5 for urgency, weak stream and straining.  He is only happened a public restrooms.  He has nocturia x1, and does drink when he gets up at night to urinate.   cipro x15 days. Just finished 5 days ago.  Denies testicular ejaculatory perineal perirectal pain or pressure at time of evaluation and starting antibiotics.  No change since taking them.  Relatively asymptomatic.  Urinalysis dipstick today has moderate blood.  PVR by bladder scan is 3 cc.             Past Medical History:   Diagnosis Date    Hypertension      Mixed hyperlipidemia                 Past Surgical History:   Procedure Laterality Date    right hand surgery             No family history on file.     Social History               Socioeconomic History    Marital status:    Tobacco Use    Smoking status: Former       Types: Cigarettes       Quit date:        Years since quittin.2    Smokeless tobacco: Former       Types: Chew       Quit date:             Review of patient's allergies indicates:  No Known Allergies     Medications Reviewed: see MAR     Focused Physical Exam         Vitals:     23 1044   BP: 139/77   Pulse: 93   Resp: 18      Body mass index is 28.5 kg/m².   Height: 5' 6" (167.6 cm)         Abdomen: Soft, non-tender, nondistended, no CVA tenderness  :  normal phallus without plaques/lesions, orthotopic urethral meatus, bilaterally desc testes in normal scrotum without mass or lesion  MARKO: normal sphincter tone, no masses, no hemmorrhoids   PROSTATE: 35g, no nodules, non-tender, nonboggy though questionably firmer on left than right which may have accounted for concern of soft consistency of right lobe.         LABS:              Recent Results (from the past 336 hour(s))   POCT " Urinalysis(Instrument)     Collection Time: 03/17/23 10:56 AM   Result Value Ref Range     Color, POC UA Yellow Yellow, Straw, Colorless     Clarity, POC UA Clear Clear     Glucose, POC UA Negative Negative     Bilirubin, POC UA Negative Negative     Ketones, POC UA Negative Negative     Spec Grav POC UA 1.020 1.005 - 1.030     Blood, POC UA Moderate (A) Negative     pH, POC UA 5.5 5.0 - 8.0     Protein, POC UA Negative Negative     Urobilinogen, POC UA 0.2 <=1.0     Nitrite, POC UA Negative Negative     WBC, POC UA Negative Negative   POCT Bladder Scan     Collection Time: 03/17/23 10:56 AM   Result Value Ref Range     POC Residual Urine Volume 3 0 - 100 mL            Assessment/Diagnosis:     1. Elevated PSA  Ambulatory referral/consult to Urology     PSA, Total and Free     Creatinine, Serum     MRI Prostate W W/O Contrast     Case Request Operating Room: BIOPSY, PROSTATE       2. Prostatitis, unspecified prostatitis type  Ambulatory referral/consult to Urology     POCT Urinalysis(Instrument)     POCT Bladder Scan     Urinalysis     Urinalysis Microscopic     Urine culture     Case Request Operating Room: BIOPSY, PROSTATE       3. Microhematuria                Plans:  We did review that hematospermia is often benign and self-limiting.  May have been related to prostate inflammation.  However in 2% or less of cases can be indication of prostate cancer.  We did review his history of elevated PSA as well as PSA elevation prior to any hematospermia concerns for prostatitis which he may or may not have had.  He has completed a course of antibiotics, based on blood in semen, questionably boggy prostate on exam, and vague symptoms.  He largely denies the symptoms today except for the self-limiting hematospermia in the absence of hematuria.     As well, his PSA elevation 1 month prior to that episode, is not the only PSA elevation, and on review of labs in Care everywhere has had PSA elevation in August of 2020 greater  than 4, and though recheck the following year was less than 4, free PSA was still quite low at 12%.     I had a long discussion with the patient regarding the natural history of cancer in men as well as when diagnostics are indicated. We also discussed differential for elevated psa which also includes benign enlargement and prostatitis.  We did discuss that an elevated PSA is considered a PSA greater than 4 because statistically 20% of people in this value range are found to have prostate cancer, however we also discussed a bit about PSA velocity and trends and age specific psa elevations. Given his true elevations, I therefore recommended prostate biopsy to evaluate for underlying malignancy.  We discussed biopsy and in detail, including 1% risk of infectious complications including sepsis but that it is an otherwise safe diagnostic procedure with expected hematuria hematospermia after.      I went over the details of a transrectal ultrasound-guided biopsy of the prostate, and described the technique in detail.   The patient will be given local injection anesthetic to block the prostate so as to minimize any pain. 12-14 biopsy specimens will be taken. These will be sent for histopathology analysis.   Complications include bleeding, fever and chills. He was also instructed to watch for any signs of fever. If he does have any fever or chills after, he was advised to come to the emergency room right away for intravenous antibiotics and possible admission to the hospital. He is to refrain from any strenuous activity including sexual activity for the next 72 hours after biopsy. He was also advised that he may have blood while urinating, during bowel movements as well as during ejaculations. He was given a prebiopsy/postbiopsy instruction sheet was reminding him to avoid aspirin and blood thinners for 7 days prior, take the Rxed antibiotics the day before, day of, day after biopsy, and perform a fleet enema at home  morning of biopsy. All questions he had were answered in detail.      As well, prior to proceeding with prostate biopsy, will recheck PSA with free and total PSA at least a few weeks after completion of his cipro as well as add free PSA to help further assess risk.  Discuss the significance of free PSA in helping to risk stratify concern for underlying disease.  We also had risk benefit discussion about MRI of the prostate noting it is recommended prior to proceeding with biopsy to determine if there are any clinically significant index lesions, which could then be targeted on MRI.  Discussed briefly cognitive versus MRI fusion.  We also reviewed that a negative MRI does not rule out disease, and biopsy still indicated.     We did also review his recent trace blood on urine dipstick at primary Care, and moderate blood on urine dipstick today.  We did discuss that micro hematuria is greater than 5 red blood cells per high-power field on urinalysis microscopic exam, which will sent today as well as culture.  Certainly if true micro hematuria present, can add on cystoscopy at time of prostate biopsy and briefly discuss this procedure, as well as get upper tract imaging prior.  Will chart check urine results to determine if this workup is necessary, otherwise     TRUS/bx scheduled at [facility] on 4/25/23  MRI of the prostate with PSA free and total and creatinine in the next 2-3 weeks      mri  midline; Region: base; Zone: Bilateral posteromedial pirad 3 lesion    Acceptable for asc

## 2023-04-26 VITALS
OXYGEN SATURATION: 98 % | DIASTOLIC BLOOD PRESSURE: 75 MMHG | HEART RATE: 66 BPM | RESPIRATION RATE: 19 BRPM | BODY MASS INDEX: 28.5 KG/M2 | TEMPERATURE: 98 F | WEIGHT: 176.56 LBS | SYSTOLIC BLOOD PRESSURE: 152 MMHG

## 2023-04-26 NOTE — OP NOTE
[facility] Urology Operative/Brief Discharge Note     Date: 4/25/23     Staff Surgeon: Thad Patricio MD     Pre-Op Diagnosis:   Elevated psa     Post-Op Diagnosis: same     Procedure(s) Performed:   Transrectal ultrasound guided prostate needle biopsy     INDICATION FOR PROCEDURE:   68 yo M with elevated psa, pirad3 lesion only on mri midline post PZ at base     ANESTHESIA: Local periprostatic block; 10 cc 1% lidocaine, and urojet 2% xylocaine per urethra     PSA: 4.7 (15.5% free)    MARKO 30g nonnodular     TRUS VOLUME: 26.19cm3  (W 44.77mm, H 25.98mm, L 43.05mm)     EBL: Minimal     SPECIMEN: 18 core prostate biopsy - right and left base, middle, apex - medial and lateral of each, incuding bilateral transition zones (2 total at RB med, RM med, LB med, and LM med)     ULTRASOUND FINDINGS: heterogenous prostate throughout, normal SVs, no med lobe, mild     CONFIRMED PATIENT TOOK ANTIBIOTICS: Yes     CONFIRMED PATIENT NOT TAKING ASPIRIN OR ANTICOAGULANTS: Yes     CONFIRMED PATIENT USED ENEMA: Yes     PROCEDURE IN DETAIL:  After informed consent, the patient was placed in left lateral position. Rocephin injected IM, as well as IM genatmicin after course of periprocedural bactrim given history sepsis     TRUS probe inserted into rectum. Ultrasound measurements taken as above and ultrasound of prostate performed with findings as above. Noted to empty bladder well based on US. Approximately 10cc of 2% lidocaine injected bilaterally in coretta-prostatic block fashion, as well as at the apex.   12 core biopsy taken in a sextant fashion with inclusion of additional cores targeting previous abnormalities as described   Patient tolerated well without complication.     CONDITION: Stable     DISHARGE:  Status post uncomplicated outpatient procedure as above.   Disposition: Home.  He will follow up in 2 weeks for biopsy results.   Resume regular diet  FU 2 weeks for biopsy results and further management discussion  Return to ER if  temp >101, uncontrollable urethral or rectal bleeding, or inability to urinate/urinary retention  No sex/ejaculation x3 days, no riding mowers/tractors/bikes x2-4 weeks  Drink plenty of water may see blood in urine

## 2023-05-04 ENCOUNTER — PATIENT MESSAGE (OUTPATIENT)
Dept: ENDOSCOPY | Facility: HOSPITAL | Age: 68
End: 2023-05-04
Payer: MEDICARE

## 2023-05-05 ENCOUNTER — TELEPHONE (OUTPATIENT)
Dept: FAMILY MEDICINE | Facility: CLINIC | Age: 68
End: 2023-05-05
Payer: MEDICARE

## 2023-05-05 NOTE — TELEPHONE ENCOUNTER
----- Message from Kizzy Yee sent at 5/5/2023  2:05 PM CDT -----  Contact: pt  Pt is calling asking for a prescription sent to email   Please give pt a call back [phone]

## 2023-05-08 ENCOUNTER — OFFICE VISIT (OUTPATIENT)
Dept: UROLOGY | Facility: CLINIC | Age: 68
End: 2023-05-08
Payer: MEDICARE

## 2023-05-08 VITALS
HEART RATE: 80 BPM | DIASTOLIC BLOOD PRESSURE: 66 MMHG | WEIGHT: 170 LBS | HEIGHT: 66 IN | BODY MASS INDEX: 27.32 KG/M2 | SYSTOLIC BLOOD PRESSURE: 128 MMHG

## 2023-05-08 DIAGNOSIS — C61 PROSTATE CANCER: Primary | ICD-10-CM

## 2023-05-08 PROCEDURE — 99999 PR PBB SHADOW E&M-EST. PATIENT-LVL III: CPT | Mod: PBBFAC,,, | Performed by: UROLOGY

## 2023-05-08 PROCEDURE — 99213 OFFICE O/P EST LOW 20 MIN: CPT | Mod: PBBFAC,PO | Performed by: UROLOGY

## 2023-05-08 PROCEDURE — 99417 PROLNG OP E/M EACH 15 MIN: CPT | Mod: S$PBB,,, | Performed by: UROLOGY

## 2023-05-08 PROCEDURE — 99417 PR PROLONGED SVC, OUTPT, W/WO DIRECT PT CONTACT,  EA ADDTL 15 MIN: ICD-10-PCS | Mod: S$PBB,,, | Performed by: UROLOGY

## 2023-05-08 PROCEDURE — 99215 PR OFFICE/OUTPT VISIT, EST, LEVL V, 40-54 MIN: ICD-10-PCS | Mod: S$PBB,,, | Performed by: UROLOGY

## 2023-05-08 PROCEDURE — 99215 OFFICE O/P EST HI 40 MIN: CPT | Mod: S$PBB,,, | Performed by: UROLOGY

## 2023-05-08 PROCEDURE — 99999 PR PBB SHADOW E&M-EST. PATIENT-LVL III: ICD-10-PCS | Mod: PBBFAC,,, | Performed by: UROLOGY

## 2023-05-08 RX ORDER — TADALAFIL 10 MG/1
10 TABLET ORAL EVERY OTHER DAY
Qty: 88 TABLET | Refills: 0 | Status: SHIPPED | OUTPATIENT
Start: 2023-05-08 | End: 2023-10-23 | Stop reason: SDUPTHER

## 2023-05-08 NOTE — PROGRESS NOTES
[facility] Urology Progress Note    Phil Verduzco is a 67 y.o. male who presents for f/u  elevated psa s/p prostate biopsy    2/24/23 [facility] blood in semen. Slight pressure and testicles.  No dysuria no frequency.  Nocturia 2 times per night.  PSA 5.7 previously 3.73 and 4.43.  No fever chills.  Urinalysis shows trace lysed blood only.  Also concern over prior asbestos exposure.  History of a pleural plaque.  He had fallen previously and had a rib fracture.  Plaque was found.  Had a CT scan March 30, 2017.  Reviewed the old doctor's note from that that we could not see the CT report and he was supposed heavy repeat CT in 1 year this was never done.  He has no shortness of breath.  Elevated PSA so will need to see urology.  He is also due for colon cancer screening.  Anxiety issues.  Needs Xanax p.r.n. for flying. Has hepatic cyst.   Physical examination noted  testes descended nontender no masses.  No hernia.  Rectal exam prostate is smooth soft not really tender but is somewhat boggy.  Given 15 day course cipro.     He presented recently noting  [place] 3 yrs ago last saw urology he thinks. Not sure who. May have been about psa. Linked his mychart to [facility] system. CE review notes only pcp and GI visits.   Care Every where psa review however notes prior elevations to 4.3 in aug 2020  PSA on file here was 5.7 on 1/7/23 which was about 1 month before his hematospermia and visit above and was aysmp  He reports that the blood in his semen was painless, lasted 1 day, resolved on its own.  Denies blood in urine  No blood thinners.  Nonsmoker.  Quit 1994  No known family history of  malignancy  Minimal to no bothersome obstructive lower urinary tract symptoms.  Only assigned 2. Some AUA symptom score items, with narrative on others noting that incomplete emptying is only sometimes at night, frequency is only after hydrating and 2 coffees in the morning, intermittency is only once at night.  He notes 0/5 for  "urgency, weak stream and straining.  He is only happened a public restrooms.  He has nocturia x1, and does drink when he gets up at night to urinate.  2/24 cipro x15 days. Just finished 5 days ago.  Denies testicular ejaculatory perineal perirectal pain or pressure at time of evaluation and starting antibiotics.  No change since taking them.  Relatively asymptomatic.  Urinalysis dipstick today has moderate blood.  PVR by bladder scan is 3 cc.      4/25/23 prostate biopsy  PSA: 4.7 (15.5% free), MRI only with pirad3 lesion only on mri midline post PZ at base (extra cores bilat base/mid), MARKO 30g nonnodular  TRUS VOLUME: 26.19cm3 - 18 core bx - std 12 + bilat tz + 2 total at RB med, RM med, LB med, and LM med. US:  heterogenous prostate throughout, normal SVs, no med lobe, mild  PATH: 8/18 cores +      - [place] grade: 3+4 =7, Group 2: 3% 1/2 LM med (20% 4)      - [place] grade: 3+3 =6, Group 1: 20% RM lat (+PNI), 20% 2/2 RM med (+PNI), 20% 1/2 RA lat (+PNI), 25% RA med (+PNI), 60% LM lat, 10% LTZ      - ASAP: RB med, LB med, LA lat, LA med      - HGPIN: RTZ, RB lat    Did well after biopsy. No sig hematuria. Hematopsermia resolving  Cialis use prn for ed. Issue is maintaining [place]ing  Colon ca 1st cousin. Cscope pending next week      Focused Physical Exam:    Vitals:    05/08/23 1548   BP: 128/66   Pulse: 80     Body mass index is 27.44 kg/m². Weight: 77.1 kg (170 lb) Height: 5' 6" (167.6 cm)     Abdomen: Soft, non-tender, nondistended, no CVA tenderness    Recent Results (from the past 336 hour(s))   Specimen to Pathology, Surgery Urology    Collection Time: 04/25/23 12:56 PM   Result Value Ref Range    Final Pathologic Diagnosis       1. Prostate, right base lateral, biopsy:      - High-grade prostatic intraepithelial neoplasia (HGPIN)      2. Prostate, right base medial, biopsy:      - Atypical small acinar proliferation (ASAP)      - High-grade prostatic intraepithelial neoplasia (HGPIN)      3. Prostate, " right mid lateral, biopsy:      - Prostatic acinar adenocarcinoma      - [place] grade: 3+3, Group 1      - The percentage of tissue with carcinoma: 20%       - The linear amount of tissue with carcinoma: 2 mm      - Perineural invasion seen      4. Prostate, right mid medial, biopsy:      - Prostatic acinar adenocarcinoma      - [place] grade: 3+3, Group 1      - Two of two cores positive for carcinoma      - The percentage of tissue with carcinoma: 20% and 20%       - The linear amount of tissue with carcinoma: 3 mm and 3 mm      - Perineural invasion seen      5. Prostate, right apex lateral, biopsy:      - Prostatic acinar adenocarcinoma      - [place] grade: 3+3, Group 1      - One of two cores positive for  carcinoma      - The percentage of tissue with carcinoma: 20%        - The linear amount of tissue with carcinoma: 2 mm       - Perineural invasion seen      - High-grade prostatic intraepithelial neoplasia (HGPIN)    6. Prostate, right apex medial, biopsy:      - Prostatic acinar adenocarcinoma      - [place] grade: 3+3, Group 1      - The percentage of tissue with carcinoma: 25%       - The linear amount of tissue with carcinoma: 2.5 mm      - Perineural invasion seen      7. Prostate, right transition zone, biopsy:      - High-grade prostatic intraepithelial neoplasia (HGPIN)    8. Prostate, left base lateral, biopsy:      - Benign prostatic tissue with focal mild acute inflammation    9. Prostate, left base medial, biopsy:      - Atypical small acinar proliferation (ASAP)    10. Prostate, left mid lateral, biopsy:      - Prostatic acinar adenocarcinoma      - [place] grade: 3+3, Group 1      - The percentage of tissue with carcinoma: 60%       - The linear amount of tissue with car cinoma: 6 mm      11. Prostate, left mid medial, biopsy:      - Prostatic acinar adenocarcinoma      - [place] grade: 3+4, Group 2      - The percentage of Pavel grade 4: 20%      - One of two cores are positive for  carcinoma      - The percentage of tissue with carcinoma: 3%       - The linear amount of tissue with carcinoma: 0.5 mm       - High-grade prostatic intraepithelial neoplasia (HGPIN)      12. Prostate, left apex lateral, biopsy:      - Atypical small acinar proliferation (ASAP)      13. Prostate, left apex medial, biopsy:      - Atypical small acinar proliferation (ASAP)      14. Prostate, left transition zone, biopsy:      - Prostatic acinar adenocarcinoma      - [place] grade: 3+3, Group 1      - The percentage of tissue with carcinoma: 10%       - The linear amount of tissue with carcinoma: 1 mm        Note: Immunohistochemical studies for p63, HMWK and AMACR support the above diagnosis. Immunostains performed with appropriate positive and negative controls.         ASSESSMENT   1. Prostate cancer            Plan    I sat with the patient and his wife and explained in detail his diagnosis of favorable intermediate risk prostate cancer, with grade group 2 dillan 3+4=7 disease including explanation of dillan grading system, and went over all the treatment options for management of his prostate cancer.  As well, high volume Dillan 6 in all other course as Dillan 7 was only in 1 core with low volume [place] 4 component, however even the not positive cores majority had atypia.  As well, high-volume involvement in small gland size.  High volume disease burden of low risk to favorable intermediate risk disease.  With small gland, disease burden also correlated well with midgland MRI positivity, and demonstrates perineural invasion on multiple cores of right side.  Given these factors, did not recommend active surveillance, therefore risks and benefits of surgery and radiation discussed in detail (largely XRT/SBRT)     We discussed radical prostatectomy. The procedure in general including hospital stay and postoperative process were discussed in detail. Risks of surgery were discussed including but not limited to  up-front urinary incontinence and erectile dysfunction which we will work to overcome with kegel excercises and any number of ED therapies, versus side effects of radiation which are often minimal and irritative upfront with more troubling complications such as stricture and radiation cystitis occurring late. We also briefly discussed psa monitoring post-treatment and had brief discussion about psa recurrence, noting radiation could be used as salvage therapy after surgery but not often vice versa. We discussed concurrent pelvic lymphadenectomy with surgery, and that sometimes lymph nodes are included in radiation fields dependent on risk. Also discussed concurrent use of ADT with radiation and its side effects and QOL impacts such as fatigue, hot flashes, libido decline, ED and mood changes.  Did however note that in many favorable intermediate risk protocols, ADT is not mandatory.  Short-term recommended to decrease future morbidities of prostate cancer, though may not be overall survival benefit.  Risk benefit if not at high risk ADT, recommended at least short term with XRT.     Extensive discussion about the upfront risks of radical prostatectomy and recovery process including Kegel exercises to resume continence, not a significant concern minimally invasive prostatectomy, as well as all protocols both rehabilitative and on demand to facilitate return of postprostatectomy potency, noting definitive option with penile prosthesis should other utilization of medical therapy, vacuum erection device, intracavernosal injection not be sufficient.  Reviewed potential for nerve sparing noting organ confined disease on imaging, and amending nerve-sparing, modifying, based on pathology, to achieve negative margin (wider/modified on right).  Compared and contrasted to late effects of radiotherapy, and impact of these, such as radiation cystitis stricture etcetera on future quality of life and difficulty of treatment of  these conditions. Overall healthy and active with good performance status and no significant abdominal surgical history and would have great recovery potential from minimally invasive prostatectomy.      For illustration of risk, reviewed his MSKCC nomogram.  As he has mixed [place] scores, when utilizing nomogram as if all positive cores were 3+3 (8 cores 3+3), indicated progression-free survival at 93% at 5 years and 87% at 10 years, with 74% chance of organ confined disease, 1% lymph node involvement, 1% seminal vesicle involvement.      In discussing proceeding with robotic prostatectomy, extensively reviewed postoperative ED management and timelines.  Also reviewed recovery timelines, based on some summer plans they have.  Very concerned about ED component and discuss rehabilitative protocols, on demand protocols, work around, timeline, and definitive solutions as above.  Offered referral to Radiation Oncology to discuss XRT option further. Deferred at this time.     All questions answered, and patient provided with NCCN patient guidelines, copy of pathology report.   Of note, if proceeding with surgery, patient is a Hindu.  We did discuss the minimal blood loss and highly unlikely need for any transfusion in any prostatectomy, and certainly is a non factor when planning surgical management for his prostate cancer       Total time spent in/on encounter today, including face to face time with patient, counseling, medical record review, interpretation of tests/results, , and treatment plan coordination: 85 minutes

## 2023-05-10 ENCOUNTER — PATIENT MESSAGE (OUTPATIENT)
Dept: UROLOGY | Facility: CLINIC | Age: 68
End: 2023-05-10
Payer: MEDICARE

## 2023-05-10 DIAGNOSIS — C61 PROSTATE CANCER: Primary | ICD-10-CM

## 2023-05-11 LAB
FINAL PATHOLOGIC DIAGNOSIS: NORMAL
GROSS: NORMAL
Lab: NORMAL
SUPPLEMENTAL DIAGNOSIS: NORMAL

## 2023-05-13 ENCOUNTER — PATIENT MESSAGE (OUTPATIENT)
Dept: UROLOGY | Facility: CLINIC | Age: 68
End: 2023-05-13
Payer: MEDICARE

## 2023-05-17 ENCOUNTER — PATIENT MESSAGE (OUTPATIENT)
Dept: ENDOSCOPY | Facility: HOSPITAL | Age: 68
End: 2023-05-17
Payer: MEDICARE

## 2023-05-17 ENCOUNTER — HOSPITAL ENCOUNTER (OUTPATIENT)
Facility: HOSPITAL | Age: 68
Discharge: HOME | End: 2023-05-17
Attending: INTERNAL MEDICINE | Admitting: INTERNAL MEDICINE
Payer: MEDICARE

## 2023-05-17 ENCOUNTER — ANESTHESIA (OUTPATIENT)
Dept: ENDOSCOPY | Facility: HOSPITAL | Age: 68
End: 2023-05-17
Payer: MEDICARE

## 2023-05-17 ENCOUNTER — ANESTHESIA EVENT (OUTPATIENT)
Dept: ENDOSCOPY | Facility: HOSPITAL | Age: 68
End: 2023-05-17
Payer: MEDICARE

## 2023-05-17 DIAGNOSIS — K63.5 COLON POLYPS: ICD-10-CM

## 2023-05-17 DIAGNOSIS — K64.8 INTERNAL HEMORRHOIDS: Primary | ICD-10-CM

## 2023-05-17 PROCEDURE — 88305 TISSUE EXAM BY PATHOLOGIST: ICD-10-PCS | Mod: 26,,, | Performed by: PATHOLOGY

## 2023-05-17 PROCEDURE — 63600175 PHARM REV CODE 636 W HCPCS: Performed by: NURSE ANESTHETIST, CERTIFIED REGISTERED

## 2023-05-17 PROCEDURE — 45380 COLONOSCOPY AND BIOPSY: CPT | Mod: PT,,, | Performed by: INTERNAL MEDICINE

## 2023-05-17 PROCEDURE — 25000003 PHARM REV CODE 250: Performed by: INTERNAL MEDICINE

## 2023-05-17 PROCEDURE — 25000003 PHARM REV CODE 250: Performed by: NURSE ANESTHETIST, CERTIFIED REGISTERED

## 2023-05-17 PROCEDURE — 37000008 HC ANESTHESIA 1ST 15 MINUTES: Performed by: INTERNAL MEDICINE

## 2023-05-17 PROCEDURE — D9220A PRA ANESTHESIA: Mod: PT,ANES,, | Performed by: ANESTHESIOLOGY

## 2023-05-17 PROCEDURE — 27201012 HC FORCEPS, HOT/COLD, DISP: Performed by: INTERNAL MEDICINE

## 2023-05-17 PROCEDURE — 45380 PR COLONOSCOPY,BIOPSY: ICD-10-PCS | Mod: PT,,, | Performed by: INTERNAL MEDICINE

## 2023-05-17 PROCEDURE — 45380 COLONOSCOPY AND BIOPSY: CPT | Mod: PT | Performed by: INTERNAL MEDICINE

## 2023-05-17 PROCEDURE — D9220A PRA ANESTHESIA: ICD-10-PCS | Mod: PT,ANES,, | Performed by: ANESTHESIOLOGY

## 2023-05-17 PROCEDURE — 88305 TISSUE EXAM BY PATHOLOGIST: CPT | Performed by: PATHOLOGY

## 2023-05-17 PROCEDURE — 88305 TISSUE EXAM BY PATHOLOGIST: CPT | Mod: 26,,, | Performed by: PATHOLOGY

## 2023-05-17 PROCEDURE — D9220A PRA ANESTHESIA: Mod: PT,CRNA,, | Performed by: NURSE ANESTHETIST, CERTIFIED REGISTERED

## 2023-05-17 PROCEDURE — D9220A PRA ANESTHESIA: ICD-10-PCS | Mod: PT,CRNA,, | Performed by: NURSE ANESTHETIST, CERTIFIED REGISTERED

## 2023-05-17 PROCEDURE — 37000009 HC ANESTHESIA EA ADD 15 MINS: Performed by: INTERNAL MEDICINE

## 2023-05-17 RX ORDER — PROPOFOL 10 MG/ML
VIAL (ML) INTRAVENOUS
Status: DISCONTINUED | OUTPATIENT
Start: 2023-05-17 | End: 2023-05-17

## 2023-05-17 RX ORDER — SODIUM CHLORIDE 9 MG/ML
INJECTION, SOLUTION INTRAVENOUS CONTINUOUS
Status: DISCONTINUED | OUTPATIENT
Start: 2023-05-17 | End: 2023-05-17 | Stop reason: HOSPADM

## 2023-05-17 RX ORDER — LIDOCAINE HYDROCHLORIDE 20 MG/ML
INJECTION INTRAVENOUS
Status: DISCONTINUED | OUTPATIENT
Start: 2023-05-17 | End: 2023-05-17

## 2023-05-17 RX ADMIN — PROPOFOL 40 MG: 10 INJECTION, EMULSION INTRAVENOUS at 07:05

## 2023-05-17 RX ADMIN — SODIUM CHLORIDE: 9 INJECTION, SOLUTION INTRAVENOUS at 07:05

## 2023-05-17 RX ADMIN — LIDOCAINE HYDROCHLORIDE 75 MG: 20 INJECTION, SOLUTION INTRAVENOUS at 07:05

## 2023-05-17 RX ADMIN — PROPOFOL 60 MG: 10 INJECTION, EMULSION INTRAVENOUS at 07:05

## 2023-05-17 NOTE — ANESTHESIA PREPROCEDURE EVALUATION
05/17/2023  Phil Verduzco is a 67 y.o., male.      Pre-op Assessment    I have reviewed the NPO Status.   I have reviewed the Medications.     Review of Systems  Anesthesia Hx:  No problems with previous Anesthesia    Cardiovascular:   Exercise tolerance: good Hypertension    Hepatic/GI:   Bowel Prep.        Physical Exam  General: Well nourished    Airway:  Mallampati: II   Mouth Opening: Normal  TM Distance: Normal  Tongue: Normal  Neck ROM: Normal ROM    Dental:  Intact    Chest/Lungs:  Clear to auscultation, Normal Respiratory Rate        Anesthesia Plan  Type of Anesthesia, risks & benefits discussed:    Anesthesia Type: Gen Natural Airway  Intra-op Monitoring Plan: Standard ASA Monitors  Induction:  IV  Informed Consent: Informed consent signed with the Patient and all parties understand the risks and agree with anesthesia plan.  All questions answered. Patient consented to blood products? Refused ([place] objection)  Patient blood refusal form needs to be completed. Please review the form for refusal details in the  tab of Chart Review.  ASA Score: 2    Ready For Surgery From Anesthesia Perspective.     .

## 2023-05-17 NOTE — ANESTHESIA POSTPROCEDURE EVALUATION
Anesthesia Post Evaluation    Patient: Phil Ostlund    Procedure(s) Performed: Procedure(s) (LRB):  COLONOSCOPY (N/A)    Final Anesthesia Type: general      Patient location during evaluation: PACU  Patient participation: Yes- Able to Participate  Level of consciousness: awake and alert and oriented  Post-procedure vital signs: reviewed and stable  Pain management: adequate  Airway patency: patent    PONV status at discharge: No PONV  Anesthetic complications: no      Cardiovascular status: blood pressure returned to baseline  Respiratory status: unassisted, spontaneous ventilation and room air  Hydration status: euvolemic  Follow-up not needed.          Vitals Value Taken Time   /79 05/17/23 0840   Temp 36.6 °C (97.9 °F) 05/17/23 0839   Pulse 64 05/17/23 0839   Resp 16 05/17/23 0839   SpO2 99 % 05/17/23 0839   Vitals shown include unvalidated device data.      Event Time   Out of Recovery 08:42:04         Pain/Valencia Score: Valencia Score: 10 (5/17/2023  8:37 AM)

## 2023-05-17 NOTE — H&P
CC: History of colon polyps - last scope 2019    67 year old male with above. States that symptoms are absent, no alleviating/exacerbating factors. No family history of colorectal CA. Positive personal history of polyps. No bleeding or weight loss.     ROS:  No headache, no fever/chills, no chest pain/SOB, no nausea/vomiting/diarrhea/constipation/GI bleeding/abdominal pain, no dysuria/hematuria.    VSSAF   Exam:   Alert and oriented x 3; no apparent distress   PERRLA, sclera anicteric  CV: Regular rate/rhythm, normal PMI   Lungs: Clear bilaterally with no wheeze/rales   Abdomen: Soft, NT/ND, normal bowel sounds   Ext: No cyanosis, clubbing     Impression:   As above    Plan:   Proceed with endoscopy. Further recs to follow.

## 2023-05-17 NOTE — PLAN OF CARE
Vss, jeffery po fluids, denies pain, ambulates easily. IV removed, catheter intact. Discharge instructions provided and states understanding. States ready to go home.  Discharged from facility with family per wheelchair.

## 2023-05-17 NOTE — TRANSFER OF CARE
Anesthesia Transfer of Care Note    Patient: Phil Ostlund    Procedure(s) Performed: Procedure(s) (LRB):  COLONOSCOPY (N/A)    Patient location: PACU    Anesthesia Type: general    Transport from OR: Transported from OR on room air with adequate spontaneous ventilation    Post pain: adequate analgesia    Post assessment: no apparent anesthetic complications and tolerated procedure well    Post vital signs: stable    Level of consciousness: awake, alert and oriented    Nausea/Vomiting: no nausea/vomiting    Complications: none    Transfer of care protocol was followed      Last vitals:   Visit Vitals  BP (!) 146/78 (BP Location: Left arm, Patient Position: Lying)   Pulse 72   Temp 36.8 °C (98.2 °F) (Skin)   Resp 16   SpO2 97%

## 2023-05-17 NOTE — PROVATION PATIENT INSTRUCTIONS
Discharge Summary/Instructions after an Endoscopic Procedure  Patient Name: Phil Verduzco  Patient MRN: [id number]  Patient YOB: 1955  Wednesday, May 17, 2023  Gerard Bowden MD  Dear patient,  As a result of recent federal legislation (The Federal Cures Act), you may   receive lab or pathology results from your procedure in your [facility]   account before your physician is able to contact you. Your physician or   their representative will relay the results to you with their   recommendations at their soonest availability.  Thank you,  RESTRICTIONS:  During your procedure today, you received medications for sedation.  These   medications may affect your judgment, balance and coordination.  Therefore,   for 24 hours, you have the following restrictions:   - DO NOT drive a car, operate machinery, make legal/financial decisions,   sign important papers or drink alcohol.    ACTIVITY:  Today: no heavy lifting, straining or running due to procedural   sedation/anesthesia.  The following day: return to full activity including work.  DIET:  Eat and drink normally unless instructed otherwise.     TREATMENT FOR COMMON SIDE EFFECTS:  - Mild abdominal pain, nausea, belching, bloating or excessive gas:  rest,   eat lightly and use a heating pad.  - Sore Throat: treat with throat lozenges and/or gargle with warm salt   water.  - Because air was used during the procedure, expelling large amounts of air   from your rectum or belching is normal.  - If a bowel prep was taken, you may not have a bowel movement for 1-3 days.    This is normal.  SYMPTOMS TO WATCH FOR AND REPORT TO YOUR PHYSICIAN:  1. Abdominal pain or bloating, other than gas cramps.  2. Chest pain.  3. Back pain.  4. Signs of infection such as: chills or fever occurring within 24 hours   after the procedure.  5. Rectal bleeding, which would show as bright red, maroon, or black stools.   (A tablespoon of blood from the rectum is not serious, especially  if   hemorrhoids are present.)  6. Vomiting.  7. Weakness or dizziness.  GO DIRECTLY TO THE NEAREST EMERGENCY ROOM IF YOU HAVE ANY OF THE FOLLOWING:      Difficulty breathing              Chills and/or fever over 101 F   Persistent vomiting and/or vomiting blood   Severe abdominal pain   Severe chest pain   Black, tarry stools   Bleeding- more than one tablespoon   Any other symptom or condition that you feel may need urgent attention  Your doctor recommends these additional instructions:  If any biopsies were taken, your doctors clinic will contact you in 1 to 2   weeks with any results.  - Patient has a contact number available for emergencies.  The signs and   symptoms of potential delayed complications were discussed with the   patient.  Return to normal activities tomorrow.  Written discharge   instructions were provided to the patient.   - High fiber diet.   - Continue present medications.   - Await pathology results.   - Repeat colonoscopy in 5 years for surveillance.   - Discharge patient to home (ambulatory).   - Return to GI office PRN.  For questions, problems or results please call your physician - Gerard Bowden MD at Work:  [phone].  [facility] PHONE NUMBER: [phone]  IF A COMPLICATION OR EMERGENCY SITUATION ARISES AND YOU ARE UNABLE TO REACH   YOUR PHYSICIAN - GO DIRECTLY TO THE EMERGENCY ROOM.  Gerard Bowden MD  5/17/2023 8:05:40 AM  This report has been verified and signed electronically.  Dear patient,  As a result of recent federal legislation (The Federal Cures Act), you may   receive lab or pathology results from your procedure in your [facility]   account before your physician is able to contact you. Your physician or   their representative will relay the results to you with their   recommendations at their soonest availability.  Thank you,  PROVATION

## 2023-05-18 VITALS
DIASTOLIC BLOOD PRESSURE: 79 MMHG | HEART RATE: 64 BPM | OXYGEN SATURATION: 99 % | SYSTOLIC BLOOD PRESSURE: 128 MMHG | TEMPERATURE: 98 F | RESPIRATION RATE: 16 BRPM

## 2023-05-22 ENCOUNTER — OFFICE VISIT (OUTPATIENT)
Dept: RADIATION ONCOLOGY | Facility: CLINIC | Age: 68
End: 2023-05-22
Payer: MEDICARE

## 2023-05-22 ENCOUNTER — DOCUMENTATION ONLY (OUTPATIENT)
Dept: HEMATOLOGY/ONCOLOGY | Facility: CLINIC | Age: 68
End: 2023-05-22

## 2023-05-22 ENCOUNTER — PATIENT MESSAGE (OUTPATIENT)
Dept: UROLOGY | Facility: CLINIC | Age: 68
End: 2023-05-22
Payer: MEDICARE

## 2023-05-22 VITALS
WEIGHT: 176.81 LBS | OXYGEN SATURATION: 98 % | SYSTOLIC BLOOD PRESSURE: 163 MMHG | RESPIRATION RATE: 16 BRPM | BODY MASS INDEX: 28.42 KG/M2 | HEIGHT: 66 IN | DIASTOLIC BLOOD PRESSURE: 85 MMHG | TEMPERATURE: 98 F | HEART RATE: 67 BPM

## 2023-05-22 DIAGNOSIS — C61 PROSTATE CANCER: ICD-10-CM

## 2023-05-22 PROCEDURE — 99205 PR OFFICE/OUTPT VISIT, NEW, LEVL V, 60-74 MIN: ICD-10-PCS | Mod: S$GLB,,, | Performed by: RADIOLOGY

## 2023-05-22 PROCEDURE — 99205 OFFICE O/P NEW HI 60 MIN: CPT | Mod: S$GLB,,, | Performed by: RADIOLOGY

## 2023-05-22 PROCEDURE — 99417 PR PROLONGED SVC, OUTPT, W/WO DIRECT PT CONTACT,  EA ADDTL 15 MIN: ICD-10-PCS | Mod: S$GLB,,, | Performed by: RADIOLOGY

## 2023-05-22 PROCEDURE — 99417 PROLNG OP E/M EACH 15 MIN: CPT | Mod: S$GLB,,, | Performed by: RADIOLOGY

## 2023-05-22 NOTE — PROGRESS NOTES
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 male Yarsani referred to Urology with hematospermia and elevated PSA of 5.7 with clear MARKO (prior elevated PSAs at OSH).    MRI prostate read as PI-RADS 3 noting normal sized prostate gland without evidence of EPE, SVI, LAD, neurovascular bundle or adjacent organ involvement.    He underwent transrectal ultrasound and biopsy with Dr. Patricio, 04/25/2023:  - [place] 3 + 4 adenocarcinoma: 20% LMM  - [place] 3 + 3 adenocarcinoma: 20% RML (PNI), 20% RMM (2/2 PNI), 20% RAL (1/2 PNI), 25% MIRNA (PNI), 60% LML, 10% LTZ   - HGPIN: RBL, RBM, RAL, RTZ, LMM   - ASAP: RBM, LBM, ADRIENNE, TIJERINA   - 8/17 cores(+); R PNI     PSA  4/23 4.7  1/23 5.7  6/21 3.73  8/20 4.43    5/17/23 C-scope with Dr. Allen  Impression:            - Non-bleeding internal hemorrhoids.                          - Two 2 to 3 mm polyps at the recto-sigmoid colon,                          removed with a cold biopsy forceps. Resected and                          retrieved.                          - The descending colon, transverse colon,                          ascending colon, cecum, ileocecal valve and                          appendiceal orifice are normal.                          - The examined portion of the ileum was normal.     Denies dysuria, hematuria, diarrhea.  Reports hemorrhoids presently causing pruritus.  Denies bone pain    AUA 17 "moderate"  QOL 3 "mixed"  IIEF 18    Review of systems otherwise negative unless indicated in HPI.    Past Medical History:   Diagnosis Date    Hypertension     Mixed hyperlipidemia     Prostate cancer      Past Surgical History:   Procedure Laterality Date    COLONOSCOPY " " 2019    COLONOSCOPY N/A 2023    Procedure: COLONOSCOPY;  Surgeon: Gerard Allen MD;  Location: [facility] ENDO;  Service: Endoscopy;  Laterality: N/A;    PROSTATE BIOPSY N/A 2023    Procedure: BIOPSY, PROSTATE;  Surgeon: Thad Patricio MD;  Location: [facility] OR;  Service: Urology;  Laterality: N/A;    right hand surgery      VASECTOMY       Social History     Socioeconomic History    Marital status:    Tobacco Use    Smoking status: Former     Types: Cigarettes     Quit date:      Years since quittin.4    Smokeless tobacco: Former     Types: Chew     Quit date:    Substance and Sexual Activity    Alcohol use: Yes     Comment: 3 drinks/day    Drug use: Never    Sexual activity: Yes     No family history on file.    PRIOR HISTORY OF CHEMOTHERAPY OR RADIOTHERAPY: Please see HPI for patients prior oncologic history.    Medication List with Changes/Refills   Current Medications    ALPRAZOLAM (XANAX) 0.5 MG TABLET    Take 1 tablet (0.5 mg total) by mouth daily as needed for Anxiety.    CIPROFLOXACIN HCL (CIPRO) 500 MG TABLET    Take 1 tablet (500 mg total) by mouth 2 (two) times daily. Start day before biopsy    LATANOPROST 0.005 % OPHTHALMIC SOLUTION    Place 1 drop into both eyes every evening.    LISINOPRIL (PRINIVIL,ZESTRIL) 40 MG TABLET    Take 1 tablet (40 mg total) by mouth once daily.    TADALAFIL (CIALIS) 10 MG TABLET    Take 1 tablet (10 mg total) by mouth every other day.    TRIAMCINOLONE ACETONIDE 0.1% (KENALOG) 0.1 % CREAM         Review of patient's allergies indicates:  No Known Allergies    QUALITY OF LIFE: 90%- Able to Carry on Normal Activity: Minor Symptoms of Disease    Vitals:    23 0837   BP: (!) 163/85   Pulse: 67   Resp: 16   Temp: 98.3 °F (36.8 °C)   TempSrc: Oral   SpO2: 98%   Weight: 80.2 kg (176 lb 12.8 oz)   Height: 5' 6" (1.676 m)   PainSc: 0-No pain     Body mass index is 28.54 kg/m².    PHYSICAL EXAM:   GENERAL: alert; in no apparent distress. "   HEAD: normocephalic, atraumatic.  EYES: pupils are equal, round, reactive to light and accommodation. Sclera anicteric. Conjunctiva not injected.   NOSE/THROAT: no nasal erythema or rhinorrhea. Oropharynx pink, without erythema, ulcerations or thrush.   NECK: no cervical motion rigidity; supple with no masses.    CHEST: Patient is speaking comfortably on room air with normal work of breathing without using accessory muscles of respiration.  CARDIOVASCULAR: regular rate and rhythm  ABDOMEN: soft, nontender, nondistended.   MUSCULOSKELETAL: no tenderness to palpation along the spine or scapulae. Normal range of motion.  NEUROLOGIC: cranial nerves II-XII intact bilaterally. Strength 5/5 in bilateral upper and lower extremities. No sensory deficits appreciated. Normal gait.  EXTREMITIES: no clubbing, cyanosis, edema.  SKIN: no erythema, rashes, ulcerations noted.     REVIEW OF IMAGING/PATHOLOGY/LABS: Please see HPI. All images reviewed personally by dictating physician.     ASSESSMENT: Phil Verduzco is a 67 y.o. male with stage Favorable intermediate risk prostate adenocarcinoma, [id number] GS 3+4 (high volume 3+3, R PNI) iPSA 5.7  PLAN:  Phil Verduzco presents with elevated PSA <10, clear MARKO and no evidence of T3, N+ or M1 disease on MRI with transrectal ultrasound and biopsy revealing 8 of 17 cores from the bilateral gland containing predominantly [place] 3 + 3 disease including 1 core from the left gland containing P[place] 3 + 4 disease.  There were several samples from the right gland demonstrating perineural invasion.  There is also several foci of HG PIN and ASAP.  Today I explained his favorable intermediate risk designation in extensive detail.  I was clear to point out his nearly 50% core involvement, multiple foci of PNI and disease throughout his gland.  For these reasons I do not recommend active surveillance though he is within those guidelines.    We discussed definitive treatment options, briefly  touching on emerging experimental (often target focused) non-NCCN approved options such as HIFU, ablation and cryotherapy.  I explained the importance in his circumstance of treating the entire gland.  We transitioned to approved definitive treatment options beginning first with radical prostatectomy with brief discussion of robotic procedure, potential for nerve-sparing with expected sacrifice of the right-sided neurovascular bundle due to right-sided PNI and expectation for postoperative incontinence requiring Kegel exercises and likely some degree of ED exacerbation.  I do anticipate a high likelihood of clearance of his disease with radical prostatectomy, estimating his need for postoperative radiotherapy at less than 20%.  I explained that adjuvant/salvage radiotherapy can be provided while salvage surgery after radiotherapy failure would require specialized center.  In either scenario, receiving both treatments increases the adverse effect profile.    In discussing definitive radiotherapy, we briefly discussed brachytherapy which is not offered at this institution.  I explained that per NCCN, he is a candidate for monotherapy and I would advise treatment at a high-volume center as this can be  dependent.  Also noted his borderline AUA symptomatology score of 17.  We then transitioned our discussion to external beam radiotherapy including discussion of IMRT planning techniques to minimize dose to surrounding structures, IGRT requiring gold fiducial +/- spaceoar placement and different fractionation schedules.  We discussed higher risk to surrounding organs with higher dose per fraction treatments.  I offered him definitive treatment options of moderately hypo fractionated regimen, 70 Gy in 28 fractions verses standard fractionation 79.2 Gy in 44 fractions.  I do not recommend coverage of the elective pelvis.    Lastly we discussed ADT.  I provided data from RTOG 0815 demonstrating improved biochemical  control and decreased risk of prostate cancer specific mortality with addition of short-term ADT for intermediate risk patients with NCCN recommendation for +ADT for unfavorable intermediate risk patients.  Again we discussed several high risk features in his case including small gland with significant disease burden and multifocal right-sided perineural invasion.  Taken together, I would advise short-term ADT with radiotherapy course.    At the end of our discussion, patient expressed he would like to wait until July to begin treatment and he will follow-up with Dr. Patricio to discuss radical prostatectomy, which is the direction he is leaning.    I carefully explained the process of simulation and treatment delivery with weekly physician visits.      We discussed the risks and benefits of the above treatment and have gone over in detail the acute and late toxicities of radiation therapy to the prostate/SV.      The patient has our contact information and understands that they are free to contact us at any time with questions or concerns regarding radiation therapy.     DISPOSITION: RTC PRN     I have personally seen and evaluated this patient with a high complexity diagnosis.      Greater than 90 minutes were dedicated to reviewing/interpreting pertinent laboratory/imaging/pathology as well as prior consultations; reviewing and performing history and physical; counseling patient on oncologic recommendations; documentation in the electronic medical record including ordering of additional tests and/or radiation treatment protocol; and coordination of care with physicians with referrals placed as appropriate.    PHYSICIAN: George Bejarano Jr, MD    Thank you for the opportunity to meet and consult with hPil Verduzco.   Please feel free to contact me to discuss the above recommendation further.

## 2023-05-23 LAB
FINAL PATHOLOGIC DIAGNOSIS: NORMAL
GROSS: NORMAL
Lab: NORMAL

## 2023-06-06 ENCOUNTER — OFFICE VISIT (OUTPATIENT)
Dept: FAMILY MEDICINE | Facility: CLINIC | Age: 68
End: 2023-06-06
Payer: MEDICARE

## 2023-06-06 VITALS
HEART RATE: 81 BPM | OXYGEN SATURATION: 98 % | HEIGHT: 66 IN | BODY MASS INDEX: 28.15 KG/M2 | DIASTOLIC BLOOD PRESSURE: 72 MMHG | SYSTOLIC BLOOD PRESSURE: 126 MMHG | TEMPERATURE: 97 F | WEIGHT: 175.13 LBS

## 2023-06-06 DIAGNOSIS — E78.5 HYPERLIPIDEMIA, UNSPECIFIED HYPERLIPIDEMIA TYPE: ICD-10-CM

## 2023-06-06 DIAGNOSIS — K64.9 HEMORRHOIDS, UNSPECIFIED HEMORRHOID TYPE: ICD-10-CM

## 2023-06-06 DIAGNOSIS — I10 HYPERTENSION, ESSENTIAL: Primary | ICD-10-CM

## 2023-06-06 DIAGNOSIS — J84.10 GRANULOMATOUS LUNG DISEASE: ICD-10-CM

## 2023-06-06 DIAGNOSIS — Z77.090 ASBESTOS EXPOSURE: ICD-10-CM

## 2023-06-06 DIAGNOSIS — C61 PROSTATE CANCER: ICD-10-CM

## 2023-06-06 PROCEDURE — 99215 PR OFFICE/OUTPT VISIT, EST, LEVL V, 40-54 MIN: ICD-10-PCS | Mod: S$GLB,,, | Performed by: FAMILY MEDICINE

## 2023-06-06 PROCEDURE — 99215 OFFICE O/P EST HI 40 MIN: CPT | Mod: S$GLB,,, | Performed by: FAMILY MEDICINE

## 2023-06-06 RX ORDER — HYDROCORTISONE 25 MG/G
CREAM TOPICAL 2 TIMES DAILY
COMMUNITY
End: 2023-06-06 | Stop reason: SDUPTHER

## 2023-06-07 ENCOUNTER — TELEPHONE (OUTPATIENT)
Dept: FAMILY MEDICINE | Facility: CLINIC | Age: 68
End: 2023-06-07
Payer: MEDICARE

## 2023-06-07 PROBLEM — J84.10 GRANULOMATOUS LUNG DISEASE: Status: ACTIVE | Noted: 2023-06-07

## 2023-06-07 RX ORDER — LISINOPRIL 40 MG/1
40 TABLET ORAL DAILY
Qty: 90 TABLET | Refills: 1 | Status: SHIPPED | OUTPATIENT
Start: 2023-06-07 | End: 2023-12-27

## 2023-06-07 RX ORDER — HYDROCORTISONE 25 MG/G
CREAM TOPICAL 2 TIMES DAILY
Qty: 28 G | Refills: 1 | Status: SHIPPED | OUTPATIENT
Start: 2023-06-07 | End: 2024-03-19

## 2023-06-08 ENCOUNTER — PATIENT MESSAGE (OUTPATIENT)
Dept: FAMILY MEDICINE | Facility: CLINIC | Age: 68
End: 2023-06-08
Payer: MEDICARE

## 2023-06-08 NOTE — PROGRESS NOTES
Subjective:       Patient ID: Phil Verduzco is a 67 y.o. male.    Chief Complaint: Anal Itching    Prostate cancer.  Biopsies positive.  PSA had been rising.  He is decided to go to [place] for a new prostate cancer treatment.  This will be done in June.   Needs preoperative clearance and will need operative lab EKG and x-ray done.    Social history former smoker.  Quit many years ago.  Alcohol 3-5 drinks per day.  Does exercise.  Does have some asbestos exposure.    Family history no changes.    Past medical history.  Hypertension.  Hyperlipidemia.  The prostate cancer.  Hemorrhoid problems for years.  Have been bothering him recently.  Prior vasectomy.  Lumbar disc disease.  Colon polyps.  Anxiety issues.  Has pulmonary granulomatous disease by CT scan in 2017 and just recently.    Review of systems weight stable.  In general feeling okay.  Eyes are doing all right.  ENT no congestion or cough.  Pulmonary no cough or sputum or dyspnea.  Cardiovascular no chest pain or palpitations.  No PND orthopnea.  GI no nausea vomiting diarrhea melena hematemesis.  Colonoscopy is current.  Recheck recommended in 5 years.   no dysuria frequency urgency or hematuria.  Does have some rectal itching from hemorrhoids.    Physical examination.  Vital signs are noted.  No acute distress.  Tympanic membranes without erythema.  Neck without bruit.  Chest clear.  Heart regular rate and rhythm.  Abdomen bowel sounds are positive soft nontender.  Extremities without edema positive pedal pulses.  Rectal area there some external tags that are somewhat irritated.      Objective:        Assessment:       1. Hypertension, essential    2. Hyperlipidemia, unspecified hyperlipidemia type    3. Prostate cancer    4. Hemorrhoids, unspecified hemorrhoid type    5. Granulomatous lung disease    6. Asbestos exposure        Plan:       Hypertension, essential  -     Basic Metabolic Panel; Future; Expected date: 06/28/2023  -     X-Ray Chest PA And  Lateral; Future; Expected date: 06/28/2023  -     EKG 12-lead; Future; Expected date: 06/28/2023    Hyperlipidemia, unspecified hyperlipidemia type    Prostate cancer  -     CBC Auto Differential; Future; Expected date: 06/28/2023  -     Urine culture; Future; Expected date: 06/28/2023  -     Urinalysis; Future; Expected date: 06/28/2023  -     PROSTATE SPECIFIC ANTIGEN, DIAGNOSTIC; Future; Expected date: 06/28/2023    Hemorrhoids, unspecified hemorrhoid type    Granulomatous lung disease    Asbestos exposure    Other orders  -     lisinopriL (PRINIVIL,ZESTRIL) 40 MG tablet; Take 1 tablet (40 mg total) by mouth once daily.  Dispense: 90 tablet; Refill: 1  -     hydrocortisone (ANUSOL-HC) 2.5 % rectal cream; Place rectally 2 (two) times daily.  Dispense: 28 g; Refill: 1    CBC BMP PSA urinalysis urine culture and sensitivity EKG and chest x-ray all ordered for his surgery.  He is okay for the surgery moderate risk.  Some Proctocream-HC p.r.n. for the rectal itching.  He should discuss specifics and effectiveness of this procedure with his urologist.

## 2023-06-08 NOTE — TELEPHONE ENCOUNTER
----- Message from Savanah Gonzalez sent at 6/7/2023 10:12 AM CDT -----  Contact: pt  Type: Needs Medical Advice         Who Called: PT  Best Call Back Number:[phone]  Additional Information: Requesting a call back regarding Pt said he was to have a new rx from his visit yesterday and nothing has been called in yet. Pt is also asking for office to call him regarding the list that he gave office yesterday regarding his procedure.   Please Advise- Thank you

## 2023-06-10 ENCOUNTER — PATIENT MESSAGE (OUTPATIENT)
Dept: FAMILY MEDICINE | Facility: CLINIC | Age: 68
End: 2023-06-10
Payer: MEDICARE

## 2023-06-12 ENCOUNTER — PATIENT MESSAGE (OUTPATIENT)
Dept: FAMILY MEDICINE | Facility: CLINIC | Age: 68
End: 2023-06-12
Payer: MEDICARE

## 2023-06-12 RX ORDER — KETOCONAZOLE 20 MG/G
CREAM TOPICAL 2 TIMES DAILY PRN
Qty: 60 G | Refills: 0 | Status: SHIPPED | OUTPATIENT
Start: 2023-06-12 | End: 2023-08-14

## 2023-06-13 ENCOUNTER — TELEPHONE (OUTPATIENT)
Dept: FAMILY MEDICINE | Facility: CLINIC | Age: 68
End: 2023-06-13
Payer: MEDICARE

## 2023-06-13 DIAGNOSIS — D22.9 SKIN MOLE: Primary | ICD-10-CM

## 2023-06-14 ENCOUNTER — HOSPITAL ENCOUNTER (OUTPATIENT)
Dept: RADIOLOGY | Facility: HOSPITAL | Age: 68
Discharge: HOME | End: 2023-06-14
Attending: FAMILY MEDICINE
Payer: MEDICARE

## 2023-06-14 DIAGNOSIS — I10 HYPERTENSION, ESSENTIAL: ICD-10-CM

## 2023-06-14 PROCEDURE — 71046 X-RAY EXAM CHEST 2 VIEWS: CPT | Mod: TC,PO

## 2023-06-15 ENCOUNTER — TELEPHONE (OUTPATIENT)
Dept: FAMILY MEDICINE | Facility: CLINIC | Age: 68
End: 2023-06-15
Payer: MEDICARE

## 2023-06-16 ENCOUNTER — PATIENT MESSAGE (OUTPATIENT)
Dept: FAMILY MEDICINE | Facility: CLINIC | Age: 68
End: 2023-06-16
Payer: MEDICARE

## 2023-06-21 ENCOUNTER — PATIENT MESSAGE (OUTPATIENT)
Dept: UROLOGY | Facility: CLINIC | Age: 68
End: 2023-06-21
Payer: MEDICARE

## 2023-06-21 ENCOUNTER — PATIENT MESSAGE (OUTPATIENT)
Dept: RADIATION ONCOLOGY | Facility: CLINIC | Age: 68
End: 2023-06-21

## 2023-06-28 ENCOUNTER — LAB VISIT (OUTPATIENT)
Dept: LAB | Facility: HOSPITAL | Age: 68
End: 2023-06-28
Attending: FAMILY MEDICINE
Payer: MEDICARE

## 2023-06-28 DIAGNOSIS — C61 PROSTATE CANCER: ICD-10-CM

## 2023-06-28 DIAGNOSIS — I10 HYPERTENSION, ESSENTIAL: ICD-10-CM

## 2023-06-28 LAB
ANION GAP SERPL CALC-SCNC: 7 MMOL/L (ref 8–16)
BASOPHILS # BLD AUTO: 0.03 K/UL (ref 0–0.2)
BASOPHILS NFR BLD: 0.6 % (ref 0–1.9)
BUN SERPL-MCNC: 16 MG/DL (ref 8–23)
CALCIUM SERPL-MCNC: 9 MG/DL (ref 8.7–10.5)
CHLORIDE SERPL-SCNC: 108 MMOL/L (ref 95–110)
CO2 SERPL-SCNC: 24 MMOL/L (ref 23–29)
COMPLEXED PSA SERPL-MCNC: 6.3 NG/ML (ref 0–4)
CREAT SERPL-MCNC: 1 MG/DL (ref 0.5–1.4)
DIFFERENTIAL METHOD: ABNORMAL
EOSINOPHIL # BLD AUTO: 0.1 K/UL (ref 0–0.5)
EOSINOPHIL NFR BLD: 1.2 % (ref 0–8)
ERYTHROCYTE [DISTWIDTH] IN BLOOD BY AUTOMATED COUNT: 13.1 % (ref 11.5–14.5)
EST. GFR  (NO RACE VARIABLE): >60 ML/MIN/1.73 M^2
GLUCOSE SERPL-MCNC: 106 MG/DL (ref 70–110)
HCT VFR BLD AUTO: 43.1 % (ref 40–54)
HGB BLD-MCNC: 14.5 G/DL (ref 14–18)
IMM GRANULOCYTES # BLD AUTO: 0.04 K/UL (ref 0–0.04)
IMM GRANULOCYTES NFR BLD AUTO: 0.8 % (ref 0–0.5)
LYMPHOCYTES # BLD AUTO: 1.1 K/UL (ref 1–4.8)
LYMPHOCYTES NFR BLD: 22.1 % (ref 18–48)
MCH RBC QN AUTO: 31.4 PG (ref 27–31)
MCHC RBC AUTO-ENTMCNC: 33.6 G/DL (ref 32–36)
MCV RBC AUTO: 93 FL (ref 82–98)
MONOCYTES # BLD AUTO: 0.5 K/UL (ref 0.3–1)
MONOCYTES NFR BLD: 11 % (ref 4–15)
NEUTROPHILS # BLD AUTO: 3.2 K/UL (ref 1.8–7.7)
NEUTROPHILS NFR BLD: 64.3 % (ref 38–73)
NRBC BLD-RTO: 0 /100 WBC
PLATELET # BLD AUTO: 187 K/UL (ref 150–450)
PMV BLD AUTO: 10.2 FL (ref 9.2–12.9)
POTASSIUM SERPL-SCNC: 4.2 MMOL/L (ref 3.5–5.1)
RBC # BLD AUTO: 4.62 M/UL (ref 4.6–6.2)
SODIUM SERPL-SCNC: 139 MMOL/L (ref 136–145)
WBC # BLD AUTO: 4.93 K/UL (ref 3.9–12.7)

## 2023-06-28 PROCEDURE — 81001 URINALYSIS AUTO W/SCOPE: CPT | Performed by: FAMILY MEDICINE

## 2023-06-28 PROCEDURE — 36415 COLL VENOUS BLD VENIPUNCTURE: CPT | Performed by: FAMILY MEDICINE

## 2023-06-28 PROCEDURE — 87086 URINE CULTURE/COLONY COUNT: CPT | Performed by: FAMILY MEDICINE

## 2023-06-28 PROCEDURE — 80048 BASIC METABOLIC PNL TOTAL CA: CPT | Performed by: FAMILY MEDICINE

## 2023-06-28 PROCEDURE — 85025 COMPLETE CBC W/AUTO DIFF WBC: CPT | Performed by: FAMILY MEDICINE

## 2023-06-28 PROCEDURE — 84153 ASSAY OF PSA TOTAL: CPT | Performed by: FAMILY MEDICINE

## 2023-06-29 LAB
BACTERIA #/AREA URNS HPF: NEGATIVE /HPF
BILIRUB UR QL STRIP: NEGATIVE
CLARITY UR: CLEAR
COLOR UR: YELLOW
GLUCOSE UR QL STRIP: NEGATIVE
HGB UR QL STRIP: ABNORMAL
HYALINE CASTS #/AREA URNS LPF: 1 /LPF
KETONES UR QL STRIP: NEGATIVE
LEUKOCYTE ESTERASE UR QL STRIP: NEGATIVE
MICROSCOPIC COMMENT: NORMAL
NITRITE UR QL STRIP: NEGATIVE
PH UR STRIP: 6 [PH] (ref 5–8)
PROT UR QL STRIP: NEGATIVE
RBC #/AREA URNS HPF: 1 /HPF (ref 0–4)
SP GR UR STRIP: 1.01 (ref 1–1.03)
SQUAMOUS #/AREA URNS HPF: 0 /HPF
URN SPEC COLLECT METH UR: ABNORMAL
UROBILINOGEN UR STRIP-ACNC: NEGATIVE EU/DL
WBC #/AREA URNS HPF: 3 /HPF (ref 0–5)

## 2023-06-30 ENCOUNTER — TELEPHONE (OUTPATIENT)
Dept: FAMILY MEDICINE | Facility: CLINIC | Age: 68
End: 2023-06-30
Payer: MEDICARE

## 2023-06-30 NOTE — TELEPHONE ENCOUNTER
----- Message from Marcelino Hickey sent at 6/30/2023  8:13 AM CDT -----  Type: Needs Medical Advice  Who Called:  [facility]    Best Call Back Number: [phone] Option 2  Additional Information: Caller states that she would like a callback regarding the mutual patient's surgery clearance

## 2023-07-01 LAB — BACTERIA UR CULT: NO GROWTH

## 2023-07-06 ENCOUNTER — TELEPHONE (OUTPATIENT)
Dept: FAMILY MEDICINE | Facility: CLINIC | Age: 68
End: 2023-07-06
Payer: MEDICARE

## 2023-07-06 ENCOUNTER — PATIENT MESSAGE (OUTPATIENT)
Dept: FAMILY MEDICINE | Facility: CLINIC | Age: 68
End: 2023-07-06
Payer: MEDICARE

## 2023-07-06 NOTE — TELEPHONE ENCOUNTER
----- Message from Elainebeto Humphreys sent at 7/6/2023  9:53 AM CDT -----  Contact: Danielle from [facility]  Type:  Needs Medical Advice    Who Called:   Danielle from [facility]    Would the patient rather a call back or a response via [facility]? Call back  Best Call Back Number: [phone]  Option 2    Additional Information: States she left a message about a week ago stating she needs an EKG rhythm strip along with office note stating he's cleared for surgery - please call to advise - thank you

## 2023-07-06 NOTE — TELEPHONE ENCOUNTER
----- Message from Carolina Rubio sent at 7/6/2023 10:31 AM CDT -----  Regarding: advise  Contact: [facility]  Type: Needs Medical Advice  Who Called:  [facility]  Symptoms (please be specific):  rhythm strip for ekg needed office notes needed for surgery clearance   How long has patient had these symptoms:    Pharmacy name and phone #:    Best Call Back Number: [phone] opt 2 fax number: [phone]  Additional Information: Please call to advise thanks!

## 2023-08-02 ENCOUNTER — PATIENT MESSAGE (OUTPATIENT)
Dept: UROLOGY | Facility: CLINIC | Age: 68
End: 2023-08-02
Payer: MEDICARE

## 2023-08-02 ENCOUNTER — PATIENT MESSAGE (OUTPATIENT)
Dept: RADIATION ONCOLOGY | Facility: CLINIC | Age: 68
End: 2023-08-02

## 2023-08-02 ENCOUNTER — PATIENT MESSAGE (OUTPATIENT)
Dept: FAMILY MEDICINE | Facility: CLINIC | Age: 68
End: 2023-08-02
Payer: MEDICARE

## 2023-08-08 ENCOUNTER — TELEPHONE (OUTPATIENT)
Dept: FAMILY MEDICINE | Facility: CLINIC | Age: 68
End: 2023-08-08
Payer: MEDICARE

## 2023-08-08 NOTE — TELEPHONE ENCOUNTER
----- Message from Anna German sent at 8/8/2023  2:16 PM CDT -----  Regarding: pt called  Name of Who is Calling: BURAK COREA [[id number]]      What is the request in detail: pt would like to submit some medical records and clinical notes to the office  so it can be added to his chart. Medical records was not available to answer his question when transfer . Please contact him. Pt stated that the notes were faxed already over a week ago to your office , but he is unable to access them online. Please advise       Can the clinic reply by [facility]: No      What [facility] to [facility] Back [facility]:Telephone Information:         [phone]

## 2023-08-10 ENCOUNTER — LAB VISIT (OUTPATIENT)
Dept: LAB | Facility: HOSPITAL | Age: 68
End: 2023-08-10
Attending: UROLOGY
Payer: MEDICARE

## 2023-08-10 DIAGNOSIS — N39.0 URINARY TRACT INFECTION, SITE NOT SPECIFIED: Primary | ICD-10-CM

## 2023-08-10 PROCEDURE — 87086 URINE CULTURE/COLONY COUNT: CPT | Performed by: UROLOGY

## 2023-08-12 LAB
BACTERIA UR CULT: NORMAL
BACTERIA UR CULT: NORMAL

## 2023-08-14 ENCOUNTER — OFFICE VISIT (OUTPATIENT)
Dept: DERMATOLOGY | Facility: CLINIC | Age: 68
End: 2023-08-14
Payer: MEDICARE

## 2023-08-14 VITALS — WEIGHT: 175.06 LBS | BODY MASS INDEX: 28.14 KG/M2 | HEIGHT: 66 IN

## 2023-08-14 DIAGNOSIS — D22.9 MULTIPLE BENIGN NEVI: ICD-10-CM

## 2023-08-14 DIAGNOSIS — Z12.83 SKIN CANCER SCREENING: ICD-10-CM

## 2023-08-14 DIAGNOSIS — L73.8 SEBACEOUS HYPERPLASIA OF FACE: ICD-10-CM

## 2023-08-14 DIAGNOSIS — D48.5 NEOPLASM OF UNCERTAIN BEHAVIOR OF SKIN: Primary | ICD-10-CM

## 2023-08-14 DIAGNOSIS — L81.4 SOLAR LENTIGO: ICD-10-CM

## 2023-08-14 DIAGNOSIS — L57.0 ACTINIC KERATOSES: ICD-10-CM

## 2023-08-14 DIAGNOSIS — D18.01 CHERRY ANGIOMA: ICD-10-CM

## 2023-08-14 PROCEDURE — 88341 IMHCHEM/IMCYTCHM EA ADD ANTB: CPT | Mod: 26,,, | Performed by: PATHOLOGY

## 2023-08-14 PROCEDURE — 88342 CHG IMMUNOCYTOCHEMISTRY: ICD-10-PCS | Mod: 26,,, | Performed by: PATHOLOGY

## 2023-08-14 PROCEDURE — 88305 TISSUE EXAM BY PATHOLOGIST: CPT | Performed by: PATHOLOGY

## 2023-08-14 PROCEDURE — 88341 PR IHC OR ICC EACH ADD'L SINGLE ANTIBODY  STAINPR: ICD-10-PCS | Mod: 26,,, | Performed by: PATHOLOGY

## 2023-08-14 PROCEDURE — 99203 PR OFFICE/OUTPT VISIT, NEW, LEVL III, 30-44 MIN: ICD-10-PCS | Mod: 25,S$GLB,, | Performed by: DERMATOLOGY

## 2023-08-14 PROCEDURE — 88305 TISSUE EXAM BY PATHOLOGIST: ICD-10-PCS | Mod: 26,,, | Performed by: PATHOLOGY

## 2023-08-14 PROCEDURE — 11102 TANGNTL BX SKIN SINGLE LES: CPT | Mod: S$GLB,,, | Performed by: DERMATOLOGY

## 2023-08-14 PROCEDURE — 17000 DESTRUCT PREMALG LESION: CPT | Mod: XS,S$GLB,, | Performed by: DERMATOLOGY

## 2023-08-14 PROCEDURE — 17000 PR DESTRUCTION(LASER SURGERY,CRYOSURGERY,CHEMOSURGERY),PREMALIGNANT LESIONS,FIRST LESION: ICD-10-PCS | Mod: XS,S$GLB,, | Performed by: DERMATOLOGY

## 2023-08-14 PROCEDURE — 88342 IMHCHEM/IMCYTCHM 1ST ANTB: CPT | Mod: 26,,, | Performed by: PATHOLOGY

## 2023-08-14 PROCEDURE — 88342 IMHCHEM/IMCYTCHM 1ST ANTB: CPT | Performed by: PATHOLOGY

## 2023-08-14 PROCEDURE — 88341 IMHCHEM/IMCYTCHM EA ADD ANTB: CPT | Performed by: PATHOLOGY

## 2023-08-14 PROCEDURE — 99203 OFFICE O/P NEW LOW 30 MIN: CPT | Mod: 25,S$GLB,, | Performed by: DERMATOLOGY

## 2023-08-14 PROCEDURE — 88305 TISSUE EXAM BY PATHOLOGIST: CPT | Mod: 26,,, | Performed by: PATHOLOGY

## 2023-08-14 PROCEDURE — 11102 PR TANGENTIAL BIOPSY, SKIN, SINGLE LESION: ICD-10-PCS | Mod: S$GLB,,, | Performed by: DERMATOLOGY

## 2023-08-14 NOTE — PROGRESS NOTES
Subjective:      Patient ID:  Phil Verduzco is a 68 y.o. male who presents for   Chief Complaint   Patient presents with    Skin Check     TBSE     New Patient    Patient here for TBSE  C/O small spot left inner upper arm.    H/o prostate CA, recent surgery    Derm Hx:  No Phx NMSC  No Fhx MM   Patient has 3 laser treatments Jan to March 2021 (treated in [place])  for Rosacea and varicose veins on face.  Uses rosatrol nightly      Current Outpatient Medications:   ·  ALPRAZolam (XANAX) 0.5 MG tablet, Take 1 tablet (0.5 mg total) by mouth daily as needed for Anxiety., Disp: 30 tablet, Rfl: 0  ·  hydrocortisone (ANUSOL-HC) 2.5 % rectal cream, Place rectally 2 (two) times daily., Disp: 28 g, Rfl: 1  ·  ketoconazole (NIZORAL) 2 % cream, Apply topically 2 (two) times daily as needed (rash)., Disp: 60 g, Rfl: 0  ·  latanoprost 0.005 % ophthalmic solution, Place 1 drop into both eyes every evening., Disp: , Rfl:   ·  lisinopriL (PRINIVIL,ZESTRIL) 40 MG tablet, Take 1 tablet (40 mg total) by mouth once daily., Disp: 90 tablet, Rfl: 1  ·  tadalafiL (CIALIS) 10 MG tablet, Take 1 tablet (10 mg total) by mouth every other day., Disp: 88 tablet, Rfl: 0  ·  triamcinolone acetonide 0.1% (KENALOG) 0.1 % cream, , Disp: , Rfl:         Review of Systems   Constitutional:  Negative for fever, chills and fatigue.   Respiratory:  Negative for cough and shortness of breath.    Skin:  Positive for activity-related sunscreen use and wears hat. Negative for itching, rash and dry skin.       Objective:   Physical Exam   Constitutional: He appears well-developed and well-nourished. No distress.   Neurological: He is alert and oriented to person, place, and time. He is not disoriented.   Psychiatric: He has a normal mood and affect.   Skin:   Areas Examined (abnormalities noted in diagram):   Scalp / Hair Palpated and Inspected  Head / Face Inspection Performed  Neck Inspection Performed  Chest / Axilla Inspection Performed  Abdomen  Inspection Performed  Genitals / Buttocks / Groin Inspection Performed  Back Inspection Performed  RUE Inspected  LUE Inspection Performed  RLE Inspected  LLE Inspection Performed  Nails and Digits Inspection Performed                 Diagram Legend     Erythematous scaling macule/papule c/w actinic keratosis       Vascular papule c/w angioma      Pigmented verrucoid papule/plaque c/w seborrheic keratosis      Yellow umbilicated papule c/w sebaceous hyperplasia      Irregularly shaped tan macule c/w lentigo     1-2 mm smooth white papules consistent with Milia      Movable subcutaneous cyst with punctum c/w epidermal inclusion cyst      Subcutaneous movable cyst c/w pilar cyst      Firm pink to brown papule c/w dermatofibroma      Pedunculated fleshy papule(s) c/w skin tag(s)      Evenly pigmented macule c/w junctional nevus     Mildly variegated pigmented, slightly irregular-bordered macule c/w mildly atypical nevus      Flesh colored to evenly pigmented papule c/w intradermal nevus       Pink pearly papule/plaque c/w basal cell carcinoma      Erythematous hyperkeratotic cursted plaque c/w SCC      Surgical scar with no sign of skin cancer recurrence      Open and closed comedones      Inflammatory papules and pustules      Verrucoid papule consistent consistent with wart     Erythematous eczematous patches and plaques     Dystrophic onycholytic nail with subungual debris c/w onychomycosis     Umbilicated papule    Erythematous-base heme-crusted tan verrucoid plaque consistent with inflamed seborrheic keratosis     Erythematous Silvery Scaling Plaque c/w Psoriasis     See annotation        Assessment / Plan:      Pathology Orders:       Normal Orders This Visit    Specimen to Pathology, Dermatology     Questions:    Procedure Type: Dermatology and skin neoplasms    Number of Specimens: 1    ------------------------: -------------------------    Spec 1 Procedure: Biopsy    Spec 1 Clinical Impression: Atrophic  telangiectatic papule, BCC vs other    Spec 1 Source: R paramedian upper forehead    Release to patient:           Neoplasm of uncertain behavior of skin  -     Ambulatory referral/consult to Dermatology  -     Specimen to Pathology, Dermatology  Shave biopsy procedure note:    Shave biopsy performed after verbal consent including risk of infection, scar, recurrence, need for additional treatment of site. Area prepped with alcohol, anesthetized with approximately 1.0cc of 1% lidocaine with epinephrine. Lesional tissue shaved with razor blade. Hemostasis achieved with application of aluminum chloride followed by hyfrecation. No complications. Dressing applied. Wound care explained.    Actinic keratoses  Cryosurgery Procedure Note    Verbal consent from the patient is obtained and the patient is aware of the precancerous quality and need for treatment of these lesions. Liquid nitrogen cryosurgery is applied to the 1 actinic keratoses, as detailed in the physical exam, to produce a freeze injury. The patient is aware that blisters may form and is instructed on wound care with gentle cleansing and use of vaseline ointment to keep moist until healed. The patient is supplied a handout on cryosurgery and is instructed to call if lesions do not completely resolve.    Sebaceous hyperplasia  This is a common condition representing benign enlargement of the sebaceous lobule. It typically occurs in adulthood. Reassurance given to patient.     Skin cancer screening  Total body skin examination performed today including at least 12 points as noted in physical examination. No lesions suspicious for malignancy noted.    Solar lentigo  This is a benign hyperpigmented sun induced lesion. Daily sun protection will reduce the number of new lesions. Treatment of these benign lesions are considered cosmetic.    Cherry angioma  This is a benign vascular lesion. Reassurance given. No treatment required.     Multiple benign nevi  Careful  dermoscopy evaluation of nevi performed with none identified as needing biopsy today  Monitor for new mole or moles that are becoming bigger, darker, irritated, or developing irregular borders.     Patient instructed in importance in daily broad spectrum sun protection of at least spf 30. Mineral sunscreen ingredients preferred (Zinc +/- Titanium) and can be found OTC.   Recommend Elta MD for daily use on face and neck.  Patient encouraged to wear hat for all outdoor exposure.   Also discussed sun avoidance and use of protective clothing.             Follow up in about 1 year (around 8/14/2024), or if symptoms worsen or fail to improve.

## 2023-08-14 NOTE — PATIENT INSTRUCTIONS
Shave Biopsy Wound Care    Your doctor has performed a shave biopsy today.  A band aid and vaseline ointment has been placed over the site.  This should remain in place for 24 hours.  It is recommended that you keep the area dry for the first 24 hours.  After 24 hours, you may remove the band aid and wash the area with warm soap and water and apply Vaseline jelly.  Many patients prefer to use Neosporin or Bacitracin ointment.  This is acceptable; however, know that you can develop an allergy to this medication even if you have used it safely for years.  It is important to keep the area moist.  Letting it dry out and get air slows healing time, and will worsen the scar.  Band aid is optional after first 24 hours.      If you notice increasing redness, tenderness, pain, or yellow drainage at the biopsy site, please notify your doctor.  These are signs of an infection.    If your biopsy site is bleeding, apply firm pressure for 15 minutes straight.  Repeat for another 15 minutes, if it is still bleeding.   If the surgical site continues to bleed, then please contact your doctor.       [facility] - DERMATOLOGY  [address], [address]  [place] [address]  Dept: [phone]  Dept Fax: [phone]      CRYOSURGERY      Your doctor has used a method called cryosurgery to treat your skin condition. Cryosurgery refers to the use of very cold substances to treat a variety of skin conditions such as warts, pre-skin cancers, molluscum contagiosum, sun spots, and several benign growths. The substance we use in cryosurgery is liquid nitrogen and is so cold (-195 degrees Celsius) that is burns when administered.     Following treatment in the office, the skin may immediately burn and become red. You may find the area around the lesion is affected as well. It is sometimes necessary to treat not only the lesion, but a small area of the surrounding normal skin to achieve a good response.     A  blister, and even a blood filled blister, may form after treatment.   This is a normal response. If the blister is painful, it is acceptable to sterilize a needle and with rubbing alcohol and gently pop the blister. It is important that you gently wash the area with soap and warm water as the blister fluid may contain wart virus if a wart was treated. Do no remove the roof of the blister.     The area treated can take anywhere from 1-3 weeks to heal. Healing time depends on the kind skin lesion treated, the location, and how aggressively the lesion was treated. It is recommended that the areas treated are covered with Vaseline or bacitracin ointment and a band-aid. If a band-aid is not practical, just ointment applied several times per day will do. Keeping these areas moist will speed the healing time.    Treatment with liquid nitrogen can leave a scar. In dark skin, it may be a light or dark scar, in light skin it may be a white or pink scar. These will generally fade with time, but may never go away completely.     If you have any concerns after your treatment, please feel free to call the office.         [facility] - DERMATOLOGY  [address], [address]  [place] [address]  Dept: [phone]  Dept Fax: [phone]

## 2023-08-16 ENCOUNTER — TELEPHONE (OUTPATIENT)
Dept: FAMILY MEDICINE | Facility: CLINIC | Age: 68
End: 2023-08-16
Payer: MEDICARE

## 2023-08-16 NOTE — TELEPHONE ENCOUNTER
----- Message from Carlitos Forrest sent at 8/16/2023  2:16 PM CDT -----  Regarding: ret call  Type: Needs Medical Advice    Who Called:  Phil    Bubba Call Back Number: [phone]    Additional Information: pt needs to know how to get lab results to correct provider. Please call to discuss how to get results to ordering provider in [place].

## 2023-08-17 NOTE — TELEPHONE ENCOUNTER
Spoke with pt , he can ck with the lab he used to see if they can send it to the dr in [facility].

## 2023-08-23 ENCOUNTER — PATIENT MESSAGE (OUTPATIENT)
Dept: DERMATOLOGY | Facility: CLINIC | Age: 68
End: 2023-08-23
Payer: MEDICARE

## 2023-08-25 LAB
COMMENT: NORMAL
FINAL PATHOLOGIC DIAGNOSIS: NORMAL
GROSS: NORMAL
Lab: NORMAL
MICROSCOPIC EXAM: NORMAL

## 2023-08-30 ENCOUNTER — TELEPHONE (OUTPATIENT)
Dept: DERMATOLOGY | Facility: CLINIC | Age: 68
End: 2023-08-30
Payer: MEDICARE

## 2023-08-30 DIAGNOSIS — D23.9 ADNEXAL TUMOR: Primary | ICD-10-CM

## 2023-08-30 DIAGNOSIS — D48.5 NEOPLASM OF UNCERTAIN BEHAVIOR OF SKIN: ICD-10-CM

## 2023-09-12 ENCOUNTER — PATIENT MESSAGE (OUTPATIENT)
Dept: FAMILY MEDICINE | Facility: CLINIC | Age: 68
End: 2023-09-12
Payer: MEDICARE

## 2023-10-02 ENCOUNTER — PATIENT MESSAGE (OUTPATIENT)
Dept: FAMILY MEDICINE | Facility: CLINIC | Age: 68
End: 2023-10-02
Payer: MEDICARE

## 2023-10-04 ENCOUNTER — PROCEDURE VISIT (OUTPATIENT)
Dept: DERMATOLOGY | Facility: CLINIC | Age: 68
End: 2023-10-04
Payer: MEDICARE

## 2023-10-04 ENCOUNTER — TELEPHONE (OUTPATIENT)
Dept: FAMILY MEDICINE | Facility: CLINIC | Age: 68
End: 2023-10-04
Payer: MEDICARE

## 2023-10-04 VITALS
WEIGHT: 173 LBS | DIASTOLIC BLOOD PRESSURE: 84 MMHG | SYSTOLIC BLOOD PRESSURE: 141 MMHG | HEART RATE: 69 BPM | BODY MASS INDEX: 27.8 KG/M2 | HEIGHT: 66 IN

## 2023-10-04 DIAGNOSIS — D48.5 NEOPLASM OF UNCERTAIN BEHAVIOR OF SKIN: ICD-10-CM

## 2023-10-04 DIAGNOSIS — D23.30 DESMOPLASTIC TRICHOEPITHELIOMA: ICD-10-CM

## 2023-10-04 PROCEDURE — 17311: ICD-10-PCS | Mod: S$PBB,,, | Performed by: DERMATOLOGY

## 2023-10-04 PROCEDURE — 13132 PR RECMPL WND HEAD,FAC,HAND 2.6-7.5 CM: ICD-10-PCS | Mod: S$PBB,51,, | Performed by: DERMATOLOGY

## 2023-10-04 PROCEDURE — 17311 MOHS 1 STAGE H/N/HF/G: CPT | Mod: PBBFAC,PO | Performed by: DERMATOLOGY

## 2023-10-04 PROCEDURE — 13132 CMPLX RPR F/C/C/M/N/AX/G/H/F: CPT | Mod: PBBFAC,PO | Performed by: DERMATOLOGY

## 2023-10-04 PROCEDURE — 17311 MOHS 1 STAGE H/N/HF/G: CPT | Mod: S$PBB,,, | Performed by: DERMATOLOGY

## 2023-10-04 PROCEDURE — 13132 CMPLX RPR F/C/C/M/N/AX/G/H/F: CPT | Mod: S$PBB,51,, | Performed by: DERMATOLOGY

## 2023-10-04 NOTE — PROGRESS NOTES
MOHS MICROGRAPHIC SURGERY OPERATIVE NOTE  Name:  Phil Verduzco  Date: 10/4/2023  Patient : 1955  Attending Surgeon: Ramesh High MD  Assistants: Ragini Tapia - Surgical Technician, Calli Pickens - Histology Technician, and Mirna Munoz - Histology Technician  Anesthetic Agent: 1% Lidocaine with 1:200,000 epinephrine  Clinical Diagnosis: desmoplastic trichoepithelioma  Operation: Mohs Micrographic Surgery  Location: right paramedian upper forehead  Indications: Location in non-mask areas of face where maximum conservation of tumor-free tissue is needed. Tumor with ill-defined borders. Tumor with high likelihood of recurrence with typical excision margins in area with minimal laxity.   Surgical Preparation: povidone-iodine     Description of Operation:  The nature and purpose of the procedure, associated risks and alternative treatments were explained to the patient in detail. All patient questions were answered completely. An informed operative consent and photography permit were obtained. The tumor location was then identified and marked with agreement by the patient of the correct location. The patient was positioned, prepped, and draped in the usual sterile manner. Local anesthesia was obtained with 1 cc(s) of 1% Lidocaine with 1:200,000 epinephrine. The lesion pre-operatively measures 0.6 by 0.5 cm.     1ST STAGE:  A 2+ mm rim of normal appearing skin was marked circumferentially around the lesion after scraping with a curette to define the margin. The area thus outlined was excised at a 45 degree angle. Hemostasis was obtained with electrodesiccation. The specimen was oriented, mapped, and subdivided into at least two sections. Sections were then chromacoded and submitted for horizontal frozen sections. The patient tolerated the procedure well and there were no complications. Upon microscopic examination of the processed horizontal frozen sections of this stage:  No residual tumor was  present.  Tumor type noted on stage 1: No tumor seen.        SUMMARY:  The tumor was extirpated in 1 Mohs Stages resulting in a final defect measuring 1.0 by 0.9 cm².   The final defect extended deep to subcutaneous fat  The patient tolerated the procedure well and no complications were noted.     PHOTOS:      Ramesh High MD    Complex Linear Closure Operative Note  Name: Phil Verduzco  YOB: 1955  Date: 10/4/2023  Attending Surgeon: Ramesh High MD FAAD  Assistants:  Ragini Tapia - Surgical Technician  Clinical Diagnosis: A 1.0 by 0.9 cm defect secondary to Mohs Micrographic Surgery  Location: right paramedian upper forehead  Operation: Complex linear closure  Surgical Preparation: broad scrub with povidone-iodine    Description of Operation:  The nature and purpose of the procedure, associated risks and alternative treatments were explained to the patient in detail. All patient questions were answered completely. In order to minimize tension on the closure and avoid compromising the anatomic contour of the cosmetic region and maximize functional capacity, a complex linear closure was performed. An informed operative consent and photography permit were obtained. The patient was positioned, prepped, and draped in the usual sterile manner. Local anesthesia was obtained with 1.5 cc(s) of 1% Lidocaine with 1:200,000 epinephrine.    The defect edges were debeveled with a #15 scalpel blade. The circular defect was widely undermined in the deep subcutaneous plane at least 100% of the defect diameter to allow for maximum tissue movement. Hemostasis was achieved with spot electrodessication. The defect was closed first utilizing multiple 4-0 Vicryl interrupted buried subcutaneous sutures. This resulted in excellent apposition of the dermis and epidermis, however two redundant areas of tissue were created on opposite sides of the defect. Utilizing a #15 scalpel blade, two Burows triangles  were excised to ensure a flat scar. Hemostasis was obtained with spot electrodessication. The skin edges throughout further fastened superficially with 5-0 Nylon. The final length of the repair was 3.0 cm.  The patient tolerated the procedure well and there were no complications.  Polysporin, Telfa and a pressure dressing were applied to sutures after gentle cleansing with saline.    Patient instructed in and provided with instructions for post-op care, will RTC in 7 days for suture removal    Post op meds: None    Photos:  Will take post-op photo @      Ramesh High MD

## 2023-10-06 ENCOUNTER — OFFICE VISIT (OUTPATIENT)
Dept: FAMILY MEDICINE | Facility: CLINIC | Age: 68
End: 2023-10-06
Payer: MEDICARE

## 2023-10-06 DIAGNOSIS — N52.9 ERECTILE DYSFUNCTION, UNSPECIFIED ERECTILE DYSFUNCTION TYPE: Primary | ICD-10-CM

## 2023-10-06 PROCEDURE — 99213 OFFICE O/P EST LOW 20 MIN: CPT | Mod: 95,,,

## 2023-10-06 PROCEDURE — 99213 PR OFFICE/OUTPT VISIT, EST, LEVL III, 20-29 MIN: ICD-10-PCS | Mod: 95,,,

## 2023-10-06 NOTE — PROGRESS NOTES
Subjective:    The patient location is: [place], [place]  The chief complaint leading to consultation is: to increase dose of cialis    Visit type: audiovisual    Face to Face time with patient: 15 minutes  25 minutes of total time spent on the encounter, which includes face to face time and non-face to face time preparing to see the patient (eg, review of tests), Obtaining and/or reviewing separately obtained history, Documenting clinical information in the electronic or other health record, Independently interpreting results (not separately reported) and communicating results to the patient/family/caregiver, or Care coordination (not separately reported).         Each patient to whom he or she provides medical services by telemedicine is:  (1) informed of the relationship between the physician and patient and the respective role of any other health care provider with respect to management of the patient; and (2) notified that he or she may decline to receive medical services by telemedicine and may withdraw from such care at any time.    Notes:           Patient ID: Phil Verduzco is a 68 y.o. male.    Chief Complaint: No chief complaint on file.    Phil Verduzco is a 68-year-old male patient who presents for virtual visit requesting an increase of Cialis.  He has a history of prostate cancer and had an Hifu few procedure on 07/27.  This has caused his erectile dysfunction to worsen.  His urologist is  in [place].  He is currently taking 10 mg as needed and is requesting to go up to 20 mg.      Review of patient's allergies indicates:  No Known Allergies  Social Determinants of Health     Tobacco Use: Medium Risk (10/4/2023)    Patient History     Smoking Tobacco Use: Former     Smokeless Tobacco Use: Former     Passive Exposure: Not on file   Alcohol Use: Not on file   Financial Resource Strain: Not on file   Food Insecurity: Not on file   Transportation Needs: Not on file   Physical Activity: Not on file    Stress: Not on file   Social Connections: Not on file   Housing Stability: Not on file   Depression: Low Risk  (6/6/2023)    Depression     Last PHQ-4: Flowsheet Data: 0      Past Medical History:   Diagnosis Date    Hypertension     Mixed hyperlipidemia     Prostate cancer     Rosacea       Past Surgical History:   Procedure Laterality Date    COLONOSCOPY  2019    COLONOSCOPY N/A 5/17/2023    Procedure: COLONOSCOPY;  Surgeon: Gerard Allen MD;  Location: [facility];  Service: Endoscopy;  Laterality: N/A;    PROSTATE BIOPSY N/A 04/25/2023    Procedure: BIOPSY, PROSTATE;  Surgeon: Thad Patricio MD;  Location: [facility] OR;  Service: Urology;  Laterality: N/A;    right hand surgery      VASECTOMY  1996      Social History     Socioeconomic History    Marital status:          Current Outpatient Medications:     ALPRAZolam (XANAX) 0.5 MG tablet, Take 1 tablet (0.5 mg total) by mouth daily as needed for Anxiety., Disp: 30 tablet, Rfl: 0    hydrocortisone (ANUSOL-HC) 2.5 % rectal cream, Place rectally 2 (two) times daily., Disp: 28 g, Rfl: 1    latanoprost 0.005 % ophthalmic solution, Place 1 drop into both eyes every evening., Disp: , Rfl:     lisinopriL (PRINIVIL,ZESTRIL) 40 MG tablet, Take 1 tablet (40 mg total) by mouth once daily., Disp: 90 tablet, Rfl: 1    tadalafiL (CIALIS) 10 MG tablet, Take 1 tablet (10 mg total) by mouth every other day., Disp: 88 tablet, Rfl: 0    triamcinolone acetonide 0.1% (KENALOG) 0.1 % cream, , Disp: , Rfl:     Lab Results   Component Value Date    WBC 4.93 06/28/2023    HGB 14.5 06/28/2023    HCT 43.1 06/28/2023     06/28/2023    CHOL 258 (H) 01/05/2023    TRIG 181 (H) 01/05/2023    HDL 57 01/05/2023    ALT 23 01/05/2023    AST 21 01/05/2023     06/28/2023    K 4.2 06/28/2023     06/28/2023    CREATININE 1.0 06/28/2023    BUN 16 06/28/2023    CO2 24 06/28/2023    PSA 5.7 (H) 01/05/2023       Review of Systems   Constitutional: Negative.    Respiratory:  Negative.     Cardiovascular: Negative.    Genitourinary:  Positive for erectile dysfunction. Negative for decreased urine volume, dysuria, frequency, hematuria, penile pain, testicular pain and urgency.       Objective:      Physical Exam  Constitutional:       General: He is not in acute distress.     Appearance: Normal appearance.   Neurological:      Mental Status: He is alert.         Assessment:       1. Erectile dysfunction, unspecified erectile dysfunction type        Plan:       Diagnoses and all orders for this visit:    Erectile dysfunction, unspecified erectile dysfunction type           Patient has 10 mg Cialis at home.  He will increase his dose to 20 mg.  If this does not help he will need to follow-up with his urologist in [place] or we can place a referral local.

## 2023-10-11 ENCOUNTER — PATIENT MESSAGE (OUTPATIENT)
Dept: FAMILY MEDICINE | Facility: CLINIC | Age: 68
End: 2023-10-11
Payer: MEDICARE

## 2023-10-23 ENCOUNTER — PATIENT MESSAGE (OUTPATIENT)
Dept: FAMILY MEDICINE | Facility: CLINIC | Age: 68
End: 2023-10-23
Payer: MEDICARE

## 2023-10-23 DIAGNOSIS — N52.9 ERECTILE DYSFUNCTION, UNSPECIFIED ERECTILE DYSFUNCTION TYPE: ICD-10-CM

## 2023-10-23 DIAGNOSIS — R97.20 ELEVATED PSA: Primary | ICD-10-CM

## 2023-10-23 DIAGNOSIS — D22.9 SKIN MOLE: ICD-10-CM

## 2023-10-23 RX ORDER — TADALAFIL 20 MG/1
TABLET ORAL
Qty: 30 TABLET | Refills: 1 | Status: SHIPPED | OUTPATIENT
Start: 2023-10-23 | End: 2024-01-10 | Stop reason: SDUPTHER

## 2023-10-23 NOTE — TELEPHONE ENCOUNTER
He was supposed to follow up with his Urologist in [place].  We can send him a prescription for the 20 mg but this needs to follow up with Urology especially since he just had surgery. If he would rather follow up with someone in [place] and needs a referral we can do that also.

## 2023-10-29 ENCOUNTER — OFFICE VISIT (OUTPATIENT)
Dept: URGENT CARE | Facility: CLINIC | Age: 68
End: 2023-10-29
Payer: MEDICARE

## 2023-10-29 VITALS
HEART RATE: 82 BPM | OXYGEN SATURATION: 97 % | BODY MASS INDEX: 29.25 KG/M2 | SYSTOLIC BLOOD PRESSURE: 155 MMHG | TEMPERATURE: 99 F | HEIGHT: 66 IN | RESPIRATION RATE: 17 BRPM | WEIGHT: 182 LBS | DIASTOLIC BLOOD PRESSURE: 85 MMHG

## 2023-10-29 DIAGNOSIS — H66.91 RIGHT OTITIS MEDIA, UNSPECIFIED OTITIS MEDIA TYPE: Primary | ICD-10-CM

## 2023-10-29 DIAGNOSIS — T70.29XA BAROTRAUMA DUE TO DIVING: ICD-10-CM

## 2023-10-29 PROCEDURE — 99204 OFFICE O/P NEW MOD 45 MIN: CPT | Mod: S$GLB,,, | Performed by: NURSE PRACTITIONER

## 2023-10-29 PROCEDURE — 99204 PR OFFICE/OUTPT VISIT, NEW, LEVL IV, 45-59 MIN: ICD-10-PCS | Mod: S$GLB,,, | Performed by: NURSE PRACTITIONER

## 2023-10-29 RX ORDER — AMOXICILLIN AND CLAVULANATE POTASSIUM 875; 125 MG/1; MG/1
1 TABLET, FILM COATED ORAL 2 TIMES DAILY
Qty: 14 TABLET | Refills: 0 | Status: SHIPPED | OUTPATIENT
Start: 2023-10-29 | End: 2023-11-05

## 2023-10-29 NOTE — PATIENT INSTRUCTIONS
EAR INFECTION  Take full course of antibiotics as prescribed.  Warm compresses to affected ear  Elevate head on a pillow at night   Use nasal spray directed for nasal congestion  Tylenol or Motrin every 4 - 6 hours as needed for fever or ear pain.  Follow up with your PCP in 1 week of initiating antibiotics or sooner for no improvement in symptoms  Follow up in the ER for any worsening of symptoms such as new fever, increasing ear pain, neck stiffness, shortness of breath, etc.  If you smoke, stop smoking.       General Discharge Instructions   If you were prescribed a narcotic or controlled medication, do not drive or operate heavy equipment or machinery while taking these medications.  If you were prescribed antibiotics, please take them to completion.  You must understand that you've received an Urgent Care treatment only and that you may be released before all your medical problems are known or treated. You, the patient, will arrange for follow up care as instructed.  Follow up with your PCP or specialty clinic as directed in the next 1-2 weeks if not improved or as needed.  You can call [phone] to schedule an appointment with the appropriate provider.  If your condition worsens we recommend that you receive another evaluation at the emergency room immediately or contact your primary medical clinics after hours call service to discuss your concerns.  Please return here or go to the Emergency Department for any concerns or worsening of condition.      WE CANNOT RULE OUT ALL POSSIBLE CAUSES OF YOUR SYMPTOMS IN THE URGENT CARE SETTING PLEASE GO TO THE ER IF YOU FEELS YOUR CONDITION IS WORSENING OR YOU WOULD LIKE EMERGENT EVALUATION.

## 2023-10-29 NOTE — PROGRESS NOTES
"Subjective:      Patient ID: Phil Verduzco is a 68 y.o. male.    Vitals:  height is 5' 6" (1.676 m) and weight is 82.6 kg (182 lb). His oral temperature is 98.5 °F (36.9 °C). His blood pressure is 155/85 (abnormal) and his pulse is 82. His respiration is 17 and oxygen saturation is 97%.     Chief Complaint: Ear Problem (Ear ache over night - Entered by patient)    Pt states that his symptoms started on last night. Patient states that his symptoms are the following: right  ear pain, ears ringing.pain level with ear 5. Pt states that he did go scuba diving about a month ago.         HENT:  Positive for ear pain.         Objective:     Physical Exam   Constitutional:  Non-toxic appearance. He does not appear ill. No distress.   HENT:   Head: Normocephalic and atraumatic.   Ears:   Right Ear: Hearing and external ear normal. Tympanic membrane is erythematous and bulging. Tympanic membrane is not perforated.   Left Ear: Hearing, tympanic membrane, external ear and ear canal normal.   Nose: Nose normal.   Pulmonary/Chest: Effort normal and breath sounds normal.   Abdominal: Normal appearance.   Neurological: no focal deficit. He is alert.   Skin: Skin is not diaphoretic.   Nursing note and vitals reviewed.      Assessment:     1. Right otitis media, unspecified otitis media type    2. Barotrauma due to diving        Plan:   Take antibiotics as prescribed  Aleve/Tylenol for pain  Follow up with ENT if no improvement    Right otitis media, unspecified otitis media type  -     amoxicillin-clavulanate 875-125mg (AUGMENTIN) 875-125 mg per tablet; Take 1 tablet by mouth 2 (two) times daily. for 7 days  Dispense: 14 tablet; Refill: 0    Barotrauma due to diving  -     Ambulatory referral/consult to ENT                Patient Instructions   EAR INFECTION  Take full course of antibiotics as prescribed.  Warm compresses to affected ear  Elevate head on a pillow at night   Use nasal spray directed for nasal congestion  Tylenol or " Motrin every 4 - 6 hours as needed for fever or ear pain.  Follow up with your PCP in 1 week of initiating antibiotics or sooner for no improvement in symptoms  Follow up in the ER for any worsening of symptoms such as new fever, increasing ear pain, neck stiffness, shortness of breath, etc.  If you smoke, stop smoking.       General Discharge Instructions   If you were prescribed a narcotic or controlled medication, do not drive or operate heavy equipment or machinery while taking these medications.  If you were prescribed antibiotics, please take them to completion.  You must understand that you've received an Urgent Care treatment only and that you may be released before all your medical problems are known or treated. You, the patient, will arrange for follow up care as instructed.  Follow up with your PCP or specialty clinic as directed in the next 1-2 weeks if not improved or as needed.  You can call [phone] to schedule an appointment with the appropriate provider.  If your condition worsens we recommend that you receive another evaluation at the emergency room immediately or contact your primary medical clinics after hours call service to discuss your concerns.  Please return here or go to the Emergency Department for any concerns or worsening of condition.      WE CANNOT RULE OUT ALL POSSIBLE CAUSES OF YOUR SYMPTOMS IN THE URGENT CARE SETTING PLEASE GO TO THE ER IF YOU FEELS YOUR CONDITION IS WORSENING OR YOU WOULD LIKE EMERGENT EVALUATION.

## 2023-10-30 ENCOUNTER — LAB VISIT (OUTPATIENT)
Dept: LAB | Facility: HOSPITAL | Age: 68
End: 2023-10-30
Attending: UROLOGY
Payer: MEDICARE

## 2023-10-30 DIAGNOSIS — C61 MALIGNANT NEOPLASM OF PROSTATE: Primary | ICD-10-CM

## 2023-10-30 LAB — COMPLEXED PSA SERPL-MCNC: 0.6 NG/ML (ref 0–4)

## 2023-10-30 PROCEDURE — 84153 ASSAY OF PSA TOTAL: CPT | Performed by: UROLOGY

## 2023-10-30 PROCEDURE — 36415 COLL VENOUS BLD VENIPUNCTURE: CPT | Performed by: UROLOGY

## 2023-11-02 ENCOUNTER — PATIENT MESSAGE (OUTPATIENT)
Dept: OTOLARYNGOLOGY | Facility: CLINIC | Age: 68
End: 2023-11-02
Payer: MEDICARE

## 2023-11-02 NOTE — TELEPHONE ENCOUNTER
Contacted patient, appointment moved up to 11/8/23 at 1:00 for audiogram, 1:30 for visit with PA. Patient confirmed and understood.

## 2023-11-08 ENCOUNTER — OFFICE VISIT (OUTPATIENT)
Dept: OTOLARYNGOLOGY | Facility: CLINIC | Age: 68
End: 2023-11-08
Payer: MEDICARE

## 2023-11-08 ENCOUNTER — CLINICAL SUPPORT (OUTPATIENT)
Dept: AUDIOLOGY | Facility: CLINIC | Age: 68
End: 2023-11-08
Payer: MEDICARE

## 2023-11-08 VITALS
BODY MASS INDEX: 29.2 KG/M2 | SYSTOLIC BLOOD PRESSURE: 144 MMHG | WEIGHT: 181.69 LBS | HEART RATE: 89 BPM | HEIGHT: 66 IN | RESPIRATION RATE: 18 BRPM | DIASTOLIC BLOOD PRESSURE: 79 MMHG

## 2023-11-08 DIAGNOSIS — H69.91 DYSFUNCTION OF RIGHT EUSTACHIAN TUBE: ICD-10-CM

## 2023-11-08 DIAGNOSIS — H60.8X3 CHRONIC ECZEMATOUS OTITIS EXTERNA OF BOTH EARS: Primary | ICD-10-CM

## 2023-11-08 DIAGNOSIS — H90.A31 MIXED CONDUCTIVE AND SENSORINEURAL HEARING LOSS OF RIGHT EAR WITH RESTRICTED HEARING OF LEFT EAR: Primary | ICD-10-CM

## 2023-11-08 DIAGNOSIS — H65.04 RECURRENT ACUTE SEROUS OTITIS MEDIA OF RIGHT EAR: ICD-10-CM

## 2023-11-08 DIAGNOSIS — T70.0XXD OTITIC BAROTRAUMA, SUBSEQUENT ENCOUNTER: ICD-10-CM

## 2023-11-08 DIAGNOSIS — H90.A22 SENSORINEURAL HEARING LOSS (SNHL) OF LEFT EAR WITH RESTRICTED HEARING OF RIGHT EAR: ICD-10-CM

## 2023-11-08 PROCEDURE — 99214 OFFICE O/P EST MOD 30 MIN: CPT | Mod: PBBFAC,27,PO | Performed by: PHYSICIAN ASSISTANT

## 2023-11-08 PROCEDURE — 99999 PR PBB SHADOW E&M-EST. PATIENT-LVL IV: CPT | Mod: PBBFAC,,, | Performed by: PHYSICIAN ASSISTANT

## 2023-11-08 PROCEDURE — 92567 TYMPANOMETRY: CPT | Mod: PBBFAC,PO | Performed by: AUDIOLOGIST

## 2023-11-08 PROCEDURE — 99999 PR PBB SHADOW E&M-EST. PATIENT-LVL IV: ICD-10-PCS | Mod: PBBFAC,,, | Performed by: PHYSICIAN ASSISTANT

## 2023-11-08 PROCEDURE — 99213 OFFICE O/P EST LOW 20 MIN: CPT | Mod: S$PBB,,, | Performed by: PHYSICIAN ASSISTANT

## 2023-11-08 PROCEDURE — 99999 PR PBB SHADOW E&M-EST. PATIENT-LVL I: CPT | Mod: PBBFAC,,, | Performed by: AUDIOLOGIST

## 2023-11-08 PROCEDURE — 99999 PR PBB SHADOW E&M-EST. PATIENT-LVL I: ICD-10-PCS | Mod: PBBFAC,,, | Performed by: AUDIOLOGIST

## 2023-11-08 PROCEDURE — 99211 OFF/OP EST MAY X REQ PHY/QHP: CPT | Mod: PBBFAC,PO | Performed by: AUDIOLOGIST

## 2023-11-08 PROCEDURE — 92557 COMPREHENSIVE HEARING TEST: CPT | Mod: PBBFAC,PO | Performed by: AUDIOLOGIST

## 2023-11-08 PROCEDURE — 99213 PR OFFICE/OUTPT VISIT, EST, LEVL III, 20-29 MIN: ICD-10-PCS | Mod: S$PBB,,, | Performed by: PHYSICIAN ASSISTANT

## 2023-11-08 RX ORDER — FLUOCINOLONE ACETONIDE 0.11 MG/ML
OIL AURICULAR (OTIC)
Qty: 20 ML | Refills: 0 | Status: SHIPPED | OUTPATIENT
Start: 2023-11-08 | End: 2024-03-19

## 2023-11-08 RX ORDER — LEVOCETIRIZINE DIHYDROCHLORIDE 5 MG/1
5 TABLET, FILM COATED ORAL NIGHTLY
Qty: 30 TABLET | Refills: 2 | Status: SHIPPED | OUTPATIENT
Start: 2023-11-08 | End: 2024-03-19

## 2023-11-08 RX ORDER — FLUTICASONE PROPIONATE 50 MCG
1 SPRAY, SUSPENSION (ML) NASAL 2 TIMES DAILY
Qty: 16 G | Refills: 2 | Status: SHIPPED | OUTPATIENT
Start: 2023-11-08 | End: 2024-03-19

## 2023-11-08 NOTE — PROGRESS NOTES
[facility]    Subjective:      Patient: Phil Verduzco Patient PCP: Romeo Gould III, MD         :  1955     Sex:  male      MRN:  [id number]          Date of Visit: 2023      Chief Complaint: Right ear barotrauma    Patient ID: Phil Verduzco is a 68 y.o. male who presents to clinic for evaluation of right ear barotrauma. Pt states that in 2023 he went scuba diving and had right hearing loss and fullness of right ear. Pt woke up with a ear ache of right ear the next day. Pt was seen at  10/29/2023 and treated for right OM with Augmentin 875mg BID 7 days. Pt states that his right ear ache went away after antibiotics. 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 Pt had myringotomy at that time and that did not help and he had to have right ear PE tube placed which helped his original otitis media around 6 years ago and then he had right PE tube placed. This alleviated his right ear issues 6 years ago and the tube fell out and he had it removed right EAC without complication. Pt states that he continues right aural fullness and hearing loss in the right ear. He no longer has right ear pain. Pt states that high-pitched right sided non-pulsatile tinnitus. Pt denies fever/chills, ear pain or discharge today. He did have dizziness with valsalva, but otherwise no issues with dizziness or vertigo. Pt has not had otologic surgery outside of his myringotomy followed by right PE tube placed 6 years ago. Pt denies prior history T&A. Pt denies significant issue with seasonal allergies. Pt is not using regular nasal sprays or anti-histamine. Pt states that he has had issues with prior hearing loss outside of ear infections. Pt has history of loud noise exposure.     Past Medical History  He has a past medical history of Hypertension, Mixed hyperlipidemia, Prostate cancer, and Rosacea.    Family History  His family  "history is not on file.    Past Surgical History:   Procedure Laterality Date    ABLATION, PROSTATE, HIGH INTENSITY FOCUSED ULTRASOUND (HIFU)  2023    patient stated due to being dx with prostate ca    COLONOSCOPY      COLONOSCOPY N/A 2023    Procedure: COLONOSCOPY;  Surgeon: Gerard Allen MD;  Location: [facility];  Service: Endoscopy;  Laterality: N/A;    PROSTATE BIOPSY N/A 2023    Procedure: BIOPSY, PROSTATE;  Surgeon: Thad Patricio MD;  Location: [facility] OR;  Service: Urology;  Laterality: N/A;    right hand surgery      VASECTOMY       Social History     Tobacco Use    Smoking status: Former     Current packs/day: 0.00     Types: Cigarettes     Quit date:      Years since quittin.8    Smokeless tobacco: Former     Types: Chew     Quit date:    Substance and Sexual Activity    Alcohol use: Yes     Comment: 3 drinks/day    Drug use: Never    Sexual activity: Yes     Medications  He has a current medication list which includes the following prescription(s): alprazolam, hydrocortisone, latanoprost, lisinopril, tadalafil, and triamcinolone acetonide 0.1%.    Review of patient's allergies indicates:  No Known Allergies  All medications, allergies, and past history have been reviewed.    Objective:      Vitals:      10/4/2023    10:55 AM 10/29/2023     9:28 AM 2023     1:31 PM   Vitals - 1 value per visit   SYSTOLIC 141 155 144   DIASTOLIC 84 85 79   Pulse 69 82 89   Temp  98.5 °F (36.9 °C)    Resp  17 18   SPO2  97 %    Weight (lb) 173 182 181.66   Weight (kg) 78.472 82.555 82.4   Height 5' 6" (1.676 m) 5' 6" (1.676 m) 5' 6" (1.676 m)   BMI (Calculated) 27.9 29.4 29.3   Pain Score Zero Five        Body surface area is 1.96 meters squared.    Physical Exam  Constitutional:       General: He is not in acute distress.     Appearance: Normal appearance. He is not ill-appearing.   HENT:      Head: Normocephalic and atraumatic.      Right Ear: Ear canal and external ear " normal. A middle ear effusion (serous effusion) is present. Tympanic membrane is not scarred, perforated, erythematous or retracted. There is monomeric changes posteroinferiorly to central TM in setting of prior PE tube.      Left Ear: Tympanic membrane, ear canal and external ear normal.      Ears:        Comments: Eczematous ear canals bilaterally.      Nose: Septal deviation (right nasal septal bone spur; leftward deviation) present.      Mouth/Throat:      Lips: Pink. No lesions.      Mouth: Mucous membranes are moist. No oral lesions.      Tongue: No lesions.      Palate: No lesions.      Pharynx: Oropharynx is clear. Uvula midline. No pharyngeal swelling, oropharyngeal exudate, posterior oropharyngeal erythema or uvula swelling.      Tonsils: No tonsillar exudate or tonsillar abscesses. 2+ on the right. 2+ on the left.   Eyes:      General:         Right eye: No discharge.         Left eye: No discharge.      Extraocular Movements: Extraocular movements intact.      Conjunctiva/sclera: Conjunctivae normal.   Pulmonary:      Effort: Pulmonary effort is normal.   Neurological:      General: No focal deficit present.      Mental Status: He is alert and oriented to person, place, and time. Mental status is at baseline.   Psychiatric:         Mood and Affect: Mood normal.         Behavior: Behavior normal.         Thought Content: Thought content normal.         Judgment: Judgment normal.         Labs:  WBC   Date Value Ref Range Status   06/28/2023 4.93 3.90 - 12.70 K/uL Final     Platelets   Date Value Ref Range Status   06/28/2023 187 150 - 450 K/uL Final     Creatinine   Date Value Ref Range Status   06/28/2023 1.0 0.5 - 1.4 mg/dL Final     Glucose   Date Value Ref Range Status   06/28/2023 106 70 - 110 mg/dL Final     All lab results and imaging results have been reviewed.    Assessment:        ICD-10-CM ICD-9-CM   1. Chronic eczematous otitis externa of both ears  H60.8X3 380.23   2. Otitic barotrauma,  subsequent encounter  T70.0XXD V58.89     993.0   3. Dysfunction of right eustachian tube  H69.91 381.81   4. Recurrent acute serous otitis media of right ear  H65.04 381.01              Plan:      Maintenance for ear wax or dry/itchy ear canals: Use mineral oil. Tilt head to the side (tilt head to right to apply in left ear and tilt head to left to apply in right ear) and place 3-4 drops in affected ear canal(s) and let sit for around 30 seconds to 1 minute and then bring head up and dab excess oil as it comes from ear canals with clean kleenex. Do this in the evening. This can help soften cerumen (ear wax) and help with the ear's natural exfoliation process to help clear ear wax. Ear cleaning still may be needed even with maintenance drops to help with cerumen (ear wax). If suffering from excessive ear wax, then over the counter debrox drops may be used and if not effective in getting rid of suspected cerumen (ear wax), then follow up in office for further assessment. This can also help as maintenance for individual that suffer from dry itchy ear canals and can also be done in the evening to help with maintenance of dry itchy ear canals). Please note that this is a handout for individuals that want better control of cerumen (ear wax) and for individuals that suffer from chronic dry itchy ear canals and can be used for individuals who suffer with both.     fluocinolone acetonide oiL 0.01 % Drop 20 mL 0 11/8/2023 -- No   Sig: Place 3 drops in to ear canals twice daily for 7 days and then once daily as needed for breakthrough ear itching/dryness while on maintenance mineral oil.     Use flonase 1 spray to each nostril twice daily. Take xyzal 5mg in the evening. Pop ears (valsalva) gently 6 times a day for 3 seconds for 1 month.     Follow up in 1 month. If still having WILLI at 3 month follow up, then will discuss placement of PE tubes.

## 2023-11-08 NOTE — PROGRESS NOTES
Phil Verduzco was seen 11/08/2023 for an audiological evaluation. Pt was alone during today's visit. Pertinent complaints today include hearing loss. Pt denies history of loud noise exposure and denies early onset of genetic family history of hearing loss. Otoscopy revealed no cerumen in both ears. The tympanic membrane was visualized AU prior to proceeding with the hearing testing.     Results reveal a moderate-to-profound mixed hearing loss for the right ear and  mild-to-severe sensorineural hearing loss for the left ear.    Speech Reception Thresholds were  50 dBHL for the right ear and 15 dBHL for the left ear.    Word recognition scores were excellent for the right ear and excellent for the left ear.   Tympanograms were Type B for the right ear and Type A for the left ear.    Recommendations: 1) Follow up with ENT     2) Recheck after treatment     3) Hearing aid consult after treatment    Audiogram results were reviewed in detail with patient and all questions were answered. Results will be reviewed by the referring provider at the completion of this note. All complaints were addressed during this visit to the patient's satisfaction.

## 2023-11-08 NOTE — PATIENT INSTRUCTIONS
Maintenance for ear wax or dry/itchy ear canals: Use mineral oil. Tilt head to the side (tilt head to right to apply in left ear and tilt head to left to apply in right ear) and place 3-4 drops in affected ear canal(s) and let sit for around 30 seconds to 1 minute and then bring head up and dab excess oil as it comes from ear canals with clean kleenex. Do this in the evening. This can help soften cerumen (ear wax) and help with the ear's natural exfoliation process to help clear ear wax. Ear cleaning still may be needed even with maintenance drops to help with cerumen (ear wax). If suffering from excessive ear wax, then over the counter debrox drops may be used and if not effective in getting rid of suspected cerumen (ear wax), then follow up in office for further assessment. This can also help as maintenance for individual that suffer from dry itchy ear canals and can also be done in the evening to help with maintenance of dry itchy ear canals). Please note that this is a handout for individuals that want better control of cerumen (ear wax) and for individuals that suffer from chronic dry itchy ear canals and can be used for individuals who suffer with both.     fluocinolone acetonide oiL 0.01 % Drop 20 mL 0 11/8/2023 -- No   Sig: Place 3 drops in to ear canals twice daily for 7 days and then once daily as needed for breakthrough ear itching/dryness while on maintenance mineral oil.     Use flonase 1 spray to each nostril twice daily. Take xyzal 5mg in the evening. Pop ears (valsalva) gently 6 times a day for 3 seconds for 1 month.

## 2023-11-13 ENCOUNTER — PATIENT MESSAGE (OUTPATIENT)
Dept: OTOLARYNGOLOGY | Facility: CLINIC | Age: 68
End: 2023-11-13
Payer: MEDICARE

## 2023-11-14 ENCOUNTER — TELEPHONE (OUTPATIENT)
Dept: OTOLARYNGOLOGY | Facility: CLINIC | Age: 68
End: 2023-11-14
Payer: MEDICARE

## 2023-11-14 NOTE — TELEPHONE ENCOUNTER
----- Message from Fab Gomez sent at 11/14/2023  9:16 AM CST -----  Type: Needs Medical Advice  Who Called:  pt  Best Call Back Number: [phone]  Additional Information: pt had his appt moved a day early wants to make sure this won't affect the insurance coverage, he is requesting to speak with the nurse, pl call bk and advise thanks

## 2023-11-14 NOTE — TELEPHONE ENCOUNTER
Returned call to patient, he states he wants to make sure since Mr. Sanchez mentioned that he would have to wait 5 weeks in order for insurance to cover his appt, I advised him that this should be okay since it will be exactly 5 weeks the day that he comes in. Patient also advised that he can call his insurance company if he wanted to make sure that this would be okay.

## 2023-11-16 NOTE — TELEPHONE ENCOUNTER
Patient called asking about message that was sent on Monday. Apologized to patient for the delayed response as I thought I had responded to it when discussed with Mr. Sanchez. Informed patient that it is not advised for him to go to high elevations if tubes are being placed at his next visit. It is not advised for him to go to high elevation either if no tubes are placed due to the pressure and fullness that it will cause. Patient was a little upset that we didn't respond to him sooner, and again I apologized for the delay in response.

## 2023-11-19 ENCOUNTER — PATIENT MESSAGE (OUTPATIENT)
Dept: OTOLARYNGOLOGY | Facility: CLINIC | Age: 68
End: 2023-11-19
Payer: MEDICARE

## 2023-12-07 ENCOUNTER — TELEPHONE (OUTPATIENT)
Dept: FAMILY MEDICINE | Facility: CLINIC | Age: 68
End: 2023-12-07
Payer: MEDICARE

## 2023-12-11 ENCOUNTER — PATIENT MESSAGE (OUTPATIENT)
Dept: FAMILY MEDICINE | Facility: CLINIC | Age: 68
End: 2023-12-11
Payer: MEDICARE

## 2023-12-12 ENCOUNTER — TELEPHONE (OUTPATIENT)
Dept: FAMILY MEDICINE | Facility: CLINIC | Age: 68
End: 2023-12-12
Payer: MEDICARE

## 2023-12-12 DIAGNOSIS — L29.0 ANAL ITCHING: Primary | ICD-10-CM

## 2023-12-12 NOTE — TELEPHONE ENCOUNTER
GI referral: diagnosis rectal itching. Spoke with patient. He has hx of hemorrhoids. Explained GI [facility] doesn't manage and will send PCP staff message for [facility] referral to . Staff message sent.

## 2023-12-13 ENCOUNTER — CLINICAL SUPPORT (OUTPATIENT)
Dept: AUDIOLOGY | Facility: CLINIC | Age: 68
End: 2023-12-13
Payer: MEDICARE

## 2023-12-13 ENCOUNTER — OFFICE VISIT (OUTPATIENT)
Dept: OTOLARYNGOLOGY | Facility: CLINIC | Age: 68
End: 2023-12-13
Payer: MEDICARE

## 2023-12-13 VITALS
RESPIRATION RATE: 18 BRPM | SYSTOLIC BLOOD PRESSURE: 147 MMHG | DIASTOLIC BLOOD PRESSURE: 84 MMHG | HEIGHT: 66 IN | HEART RATE: 80 BPM | WEIGHT: 186.31 LBS | BODY MASS INDEX: 29.94 KG/M2

## 2023-12-13 DIAGNOSIS — H69.91 DYSFUNCTION OF RIGHT EUSTACHIAN TUBE: ICD-10-CM

## 2023-12-13 DIAGNOSIS — H90.A22 SENSORINEURAL HEARING LOSS (SNHL) OF LEFT EAR WITH RESTRICTED HEARING OF RIGHT EAR: Primary | ICD-10-CM

## 2023-12-13 DIAGNOSIS — H90.A31 MIXED CONDUCTIVE AND SENSORINEURAL HEARING LOSS OF RIGHT EAR WITH RESTRICTED HEARING OF LEFT EAR: Primary | ICD-10-CM

## 2023-12-13 DIAGNOSIS — H90.A22 SENSORINEURAL HEARING LOSS (SNHL) OF LEFT EAR WITH RESTRICTED HEARING OF RIGHT EAR: ICD-10-CM

## 2023-12-13 DIAGNOSIS — H90.A31 MIXED CONDUCTIVE AND SENSORINEURAL HEARING LOSS OF RIGHT EAR WITH RESTRICTED HEARING OF LEFT EAR: ICD-10-CM

## 2023-12-13 PROCEDURE — 99999 PR PBB SHADOW E&M-EST. PATIENT-LVL IV: ICD-10-PCS | Mod: PBBFAC,,, | Performed by: PHYSICIAN ASSISTANT

## 2023-12-13 PROCEDURE — 99213 PR OFFICE/OUTPT VISIT, EST, LEVL III, 20-29 MIN: ICD-10-PCS | Mod: S$PBB,,, | Performed by: PHYSICIAN ASSISTANT

## 2023-12-13 PROCEDURE — 99213 OFFICE O/P EST LOW 20 MIN: CPT | Mod: S$PBB,,, | Performed by: PHYSICIAN ASSISTANT

## 2023-12-13 PROCEDURE — 99999 PR PBB SHADOW E&M-EST. PATIENT-LVL I: CPT | Mod: PBBFAC,,, | Performed by: AUDIOLOGIST

## 2023-12-13 PROCEDURE — 99999 PR PBB SHADOW E&M-EST. PATIENT-LVL IV: CPT | Mod: PBBFAC,,, | Performed by: PHYSICIAN ASSISTANT

## 2023-12-13 PROCEDURE — 99999 PR PBB SHADOW E&M-EST. PATIENT-LVL I: ICD-10-PCS | Mod: PBBFAC,,, | Performed by: AUDIOLOGIST

## 2023-12-13 PROCEDURE — 92557 COMPREHENSIVE HEARING TEST: CPT | Mod: PBBFAC,PO | Performed by: AUDIOLOGIST

## 2023-12-13 PROCEDURE — 99214 OFFICE O/P EST MOD 30 MIN: CPT | Mod: PBBFAC,27,PO | Performed by: PHYSICIAN ASSISTANT

## 2023-12-13 PROCEDURE — 92567 TYMPANOMETRY: CPT | Mod: PBBFAC,PO | Performed by: AUDIOLOGIST

## 2023-12-13 PROCEDURE — 99211 OFF/OP EST MAY X REQ PHY/QHP: CPT | Mod: PBBFAC,25,27,PO | Performed by: AUDIOLOGIST

## 2023-12-13 NOTE — PATIENT INSTRUCTIONS
"Use flonase 1 spray to each nostril twice daily. Take xyzal 5mg in the evening. Pop ears (valsalva) gently 6 times a day for 3 seconds for 1 month.      When flying:  Take Sudafed 60 mg 1 hour prior to flying. Afrin nasal spray 30 minutes before take-off. Chew gum/pop ears gently when taking off and coming down. Drink water. Use "Airplanes" or [employer]'s "Flight Mates" ear plugs designed for flying (available on [employer]).     "

## 2023-12-13 NOTE — PROGRESS NOTES
[facility]    Subjective:      Patient: Phil Verduzco Patient PCP: Romeo Gould III, MD         :  1955     Sex:  male      MRN:  [id number]          Date of Visit: 2023      Chief Complaint: Right serous OM follow up    Interval History 2023: Pt states that his right ear hearing has improved. Pt has been doing valsalva 6 times a day. Pt has been using flonase 1 spray to each nostril twice daily. Pt is taking xyzal 5mg in the evening. He is having occasional tinnitus in the right ear. No ear pain/discharge. Pt no longer feels like he has fluid in his right ear. He just has some aural fullness of the right ear. He has no other ENT complaints today in office.     Patient ID 2023: Phil Verduzco is a 68 y.o. male who presents to clinic for evaluation of right ear barotrauma. Pt states that in 2023 he went scuba diving and had right hearing loss and fullness of right ear. Pt woke up with a ear ache of right ear the next day. Pt was seen at  10/29/2023 and treated for right OM with Augmentin 875mg BID 7 days. Pt states that his right ear ache went away after antibiotics. 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 Pt had myringotomy at that time and that did not help and he had to have right ear PE tube placed which helped his original otitis media around 6 years ago and then he had right PE tube placed. This alleviated his right ear issues 6 years ago and the tube fell out and he had it removed right EAC without complication. Pt states that he continues right aural fullness and hearing loss in the right ear. He no longer has right ear pain. Pt states that high-pitched right sided non-pulsatile tinnitus. Pt denies fever/chills, ear pain or discharge today. He did have dizziness with valsalva, but otherwise no issues with dizziness or vertigo. Pt has not had otologic surgery outside of his  myringotomy followed by right PE tube placed 6 years ago. Pt denies prior history T&A. Pt denies significant issue with seasonal allergies. Pt is not using regular nasal sprays or anti-histamine. Pt states that he has had issues with prior hearing loss outside of ear infections. Pt has history of loud noise exposure.     Past Medical History  He has a past medical history of Hypertension, Mixed hyperlipidemia, Prostate cancer, and Rosacea.    Family History  His family history is not on file.    Past Surgical History:   Procedure Laterality Date    ABLATION, PROSTATE, HIGH INTENSITY FOCUSED ULTRASOUND (HIFU)  2023    patient stated due to being dx with prostate ca    COLONOSCOPY      COLONOSCOPY N/A 2023    Procedure: COLONOSCOPY;  Surgeon: Gerard Allen MD;  Location: [facility];  Service: Endoscopy;  Laterality: N/A;    PROSTATE BIOPSY N/A 2023    Procedure: BIOPSY, PROSTATE;  Surgeon: Thad Patricio MD;  Location: [facility];  Service: Urology;  Laterality: N/A;    right hand surgery      VASECTOMY       Social History     Tobacco Use    Smoking status: Former     Current packs/day: 0.00     Types: Cigarettes     Quit date:      Years since quittin.9    Smokeless tobacco: Former     Types: Chew     Quit date:    Substance and Sexual Activity    Alcohol use: Yes     Comment: 3 drinks/day    Drug use: Never    Sexual activity: Yes     Medications  He has a current medication list which includes the following prescription(s): alprazolam, fluocinolone acetonide oil, fluticasone propionate, hydrocortisone, latanoprost, levocetirizine, lisinopril, tadalafil, and triamcinolone acetonide 0.1%.    Review of patient's allergies indicates:  No Known Allergies  All medications, allergies, and past history have been reviewed.    Objective:      Vitals:      10/29/2023     9:28 AM 2023     1:31 PM 2023     1:20 PM   Vitals - 1 value per visit   SYSTOLIC 155 144 147  "  DIASTOLIC 85 79 84   Pulse 82 89 80   Temp 98.5 °F (36.9 °C)     Resp 17 18 18   SPO2 97 %     Weight (lb) 182 181.66 186.29   Weight (kg) 82.555 82.4 84.5   Height 5' 6" (1.676 m) 5' 6" (1.676 m) 5' 6" (1.676 m)   BMI (Calculated) 29.4 29.3 30.1   Pain Score Five         Body surface area is 1.98 meters squared.    Physical Exam  Constitutional:       General: He is not in acute distress.     Appearance: Normal appearance. He is not ill-appearing.   HENT:      Head: Normocephalic and atraumatic.      Right Ear: Ear canal and external ear normal. No drainage. No middle ear effusion. There is no impacted cerumen. Tympanic membrane is retracted (mild). Tympanic membrane is not perforated, erythematous or bulging.      Left Ear: Tympanic membrane, ear canal and external ear normal. No drainage.  No middle ear effusion. There is no impacted cerumen. Tympanic membrane is not perforated, erythematous, retracted or bulging.      Nose: Septal deviation (right nasal septal bone spur; leftward deviation) present.      Mouth/Throat:      Lips: Pink. No lesions.      Mouth: Mucous membranes are moist. No oral lesions.      Tongue: No lesions.      Palate: No lesions.      Pharynx: Oropharynx is clear. Uvula midline. No pharyngeal swelling, oropharyngeal exudate, posterior oropharyngeal erythema or uvula swelling.      Tonsils: No tonsillar exudate or tonsillar abscesses. 2+ on the right. 2+ on the left.   Eyes:      General:         Right eye: No discharge.         Left eye: No discharge.      Extraocular Movements: Extraocular movements intact.      Conjunctiva/sclera: Conjunctivae normal.   Pulmonary:      Effort: Pulmonary effort is normal.   Neurological:      General: No focal deficit present.      Mental Status: He is alert and oriented to person, place, and time. Mental status is at baseline.         Labs:  WBC   Date Value Ref Range Status   06/28/2023 4.93 3.90 - 12.70 K/uL Final     Platelets   Date Value Ref Range " Status   06/28/2023 187 150 - 450 K/uL Final     Creatinine   Date Value Ref Range Status   06/28/2023 1.0 0.5 - 1.4 mg/dL Final     Glucose   Date Value Ref Range Status   06/28/2023 106 70 - 110 mg/dL Final     Audiogram Summary            All lab results and imaging results have been reviewed.    Assessment:        ICD-10-CM ICD-9-CM   1. Mixed conductive and sensorineural hearing loss of right ear with restricted hearing of left ear  H90.A31 389.22   2. Sensorineural hearing loss (SNHL) of left ear with restricted hearing of right ear  H90.A22 389.22   3. Dysfunction of right eustachian tube  H69.91 381.81     Plan:      Pt's right serous OM has resolved. He has had improvement in right sided hearing. He does have right TM retraction. He has a mild to severe MHL for right ear and mild to severe SNHL for left ear. Tymp for right ear is Type C today in office in setting of eustachian tube dysfunction. He does still have conductive component to hearing loss at 500Hz and 1,000Hz in setting of right ETD, but has had improvement since his prior audiogram. We discussed PE tube for right ear and have made the decision that since he has had improvement in right ear and now just has right ETD without effusion, we will hold on PE tube at this time and pt will continue conservative management and use flonase 1 spray to each nostril twice daily. Take xyzal 5mg in the evening. Pop ears (valsalva) gently 6 times a day for 3 seconds for 1 month. I have provided pt with management when flying to reduce incident of ear barotrauma via AVS. Pt does not feel the need for hearing aids at this time. His middle ear function is improving on right side, so we will proceed with annual audiograms to monitor hearing loss. Pt advised to follow up if ear symptoms persist or worsen or if having ENT issues/concerns.

## 2023-12-13 NOTE — PROGRESS NOTES
Phil Verduzco was seen 12/13/2023 for an audiological evaluation. Pt was alone during today's visit. Pertinent complaints today include hearing loss. Pt here for a follow up hearing test. He feels like hearing is better than last time. Otoscopy revealed no cerumen in both ears. The tympanic membrane was visualized AU prior to proceeding with the hearing testing.     Results reveal a mild-to-profound mixed hearing loss for the right ear and  mild-to-severe sensorineural hearing loss for the left ear.    Speech Reception Thresholds were  30 dBHL for the right ear and 15 dBHL for the left ear.    Word recognition scores were excellent for the right ear and excellent for the left ear.   Tympanograms were Type C for the right ear and Type A for the left ear.    Recommendations: 1) Follow up with ENT     2) Annual hearing evaluation     3) Hearing aid consult when ready    Audiogram results were reviewed in detail with patient and all questions were answered. Results will be reviewed by the referring provider at the completion of this note. All complaints were addressed during this visit to the patient's satisfaction.

## 2023-12-18 ENCOUNTER — TELEPHONE (OUTPATIENT)
Dept: SURGERY | Facility: CLINIC | Age: 68
End: 2023-12-18
Payer: MEDICARE

## 2023-12-18 NOTE — TELEPHONE ENCOUNTER
MARCIN regarding referral. States Allen's office was supposed to call him back with name of proctologist and appt. Advised pt to call office back and request to speak with scheduling.

## 2023-12-18 NOTE — TELEPHONE ENCOUNTER
I called patient and he stated that he has an appointment with Dr. Ackerman on Wednesday, 12/20/23 @ 10:40am @ [facility].  I informed him that Dr. Jones doesn't have any appointments available until 1/3/24 @ [facility].  Patient states that  he'll keep his appointment with Dr. Ackerman.  Akash

## 2023-12-18 NOTE — TELEPHONE ENCOUNTER
----- Message from Pia Cabrera sent at 12/18/2023  2:37 PM CST -----  Type:  Patient Returning Call    Who Called:  patient  Who Left Message for Patient:  Jes  Does the patient know what this is regarding?:  anal itching  Best Call Back Number:  [phone]  Additional Information:

## 2023-12-18 NOTE — PROGRESS NOTES
"[facility]    Subjective:      Patient: Phil Verduzco Patient PCP: Romeo Gould III, MD         :  1955     Sex:  male      MRN:  [id number]          Date of Visit: 2023      Chief Complaint:     Patient ID: Phil Verduzco is a 68 y.o. male who was {display:84828:o:"self-referred","referred to me by Aaareferral Self in consultation"} for {display:44038:o:"ear infection","aural fullness","ear pain","tinnitus","dizziness","hearing loss"}.    Hearing loss:  Tinnitus:  Aural fullness:  Fluctuations in hearing:  Ear pain/discharge:  Vertigo:  Prior ear surgery:  History of loud noise exposure:  Audiogram in the past 12 months:     Review of Systems   Constitutional: Negative.    HENT:  Positive for hearing loss.    Eyes: Negative.    Respiratory: Negative.     Cardiovascular: Negative.    Endocrine: Negative.    Genitourinary:  Positive for difficulty urinating.   Skin:  Positive for rash.   Allergic/Immunologic: Negative.    Neurological: Negative.    Hematological: Negative.    Psychiatric/Behavioral: Negative.          Past Medical History  He has a past medical history of Hypertension, Mixed hyperlipidemia, Prostate cancer, and Rosacea.    Family History  His family history is not on file.    Past Surgical History:   Procedure Laterality Date    ABLATION, PROSTATE, HIGH INTENSITY FOCUSED ULTRASOUND (HIFU)  2023    patient stated due to being dx with prostate ca    COLONOSCOPY  2019    COLONOSCOPY N/A 2023    Procedure: COLONOSCOPY;  Surgeon: Gerard Allen MD;  Location: [facility];  Service: Endoscopy;  Laterality: N/A;    PROSTATE BIOPSY N/A 2023    Procedure: BIOPSY, PROSTATE;  Surgeon: Thad Patricio MD;  [facility];  Service: Urology;  Laterality: N/A;    right hand surgery      VASECTOMY       Social History     Tobacco Use    Smoking status: Former     Current packs/day: 0.00     Types: Cigarettes     Quit date:      Years since quittin.9    " "Smokeless tobacco: Former     Types: Chew     Quit date: 1994   Substance and Sexual Activity    Alcohol use: Yes     Comment: 3 drinks/day    Drug use: Never    Sexual activity: Yes     Medications  He has a current medication list which includes the following prescription(s): alprazolam, fluocinolone acetonide oil, fluticasone propionate, hydrocortisone, latanoprost, levocetirizine, lisinopril, tadalafil, and triamcinolone acetonide 0.1%.    Review of patient's allergies indicates:  No Known Allergies  All medications, allergies, and past history have been reviewed.    Objective:      Vitals:      10/29/2023     9:28 AM 11/8/2023     1:31 PM 12/13/2023     1:20 PM   Vitals - 1 value per visit   SYSTOLIC 155 144 147   DIASTOLIC 85 79 84   Pulse 82 89 80   Temp 98.5 °F (36.9 °C)     Resp 17 18 18   SPO2 97 %     Weight (lb) 182 181.66 186.29   Weight (kg) 82.555 82.4 84.5   Height 5' 6" (1.676 m) 5' 6" (1.676 m) 5' 6" (1.676 m)   BMI (Calculated) 29.4 29.3 30.1   Pain Score Five         Body surface area is 1.98 meters squared.    Physical Exam  Constitutional:       General: He is not in acute distress.     Appearance: Normal appearance. He is not ill-appearing.   HENT:      Head: Normocephalic and atraumatic.      Right Ear: Ear canal and external ear normal. No drainage. No middle ear effusion. There is no impacted cerumen. Tympanic membrane is retracted (mild). Tympanic membrane is not perforated, erythematous or bulging.      Left Ear: Tympanic membrane, ear canal and external ear normal. No drainage.  No middle ear effusion. There is no impacted cerumen. Tympanic membrane is not perforated, erythematous, retracted or bulging.      Nose: Septal deviation (right nasal septal bone spur; leftward deviation) present.      Mouth/Throat:      Lips: Pink. No lesions.      Mouth: Mucous membranes are moist. No oral lesions.      Tongue: No lesions.      Palate: No lesions.      Pharynx: Oropharynx is clear. Uvula " "midline. No pharyngeal swelling, oropharyngeal exudate, posterior oropharyngeal erythema or uvula swelling.      Tonsils: No tonsillar exudate or tonsillar abscesses. 2+ on the right. 2+ on the left.   Eyes:      General:         Right eye: No discharge.         Left eye: No discharge.      Extraocular Movements: Extraocular movements intact.      Conjunctiva/sclera: Conjunctivae normal.   Pulmonary:      Effort: Pulmonary effort is normal.   Neurological:      General: No focal deficit present.      Mental Status: He is alert and oriented to person, place, and time. Mental status is at baseline.         Procedure:  {display:42152:a:"Cerumen removal performed.  See procedure note.","Flexible laryngoscopy performed.  See procedure note.","Nasal endoscopy performed.  See procedure note.","None"}    Labs:  WBC   Date Value Ref Range Status   06/28/2023 4.93 3.90 - 12.70 K/uL Final     Platelets   Date Value Ref Range Status   06/28/2023 187 150 - 450 K/uL Final     Creatinine   Date Value Ref Range Status   06/28/2023 1.0 0.5 - 1.4 mg/dL Final     Glucose   Date Value Ref Range Status   06/28/2023 106 70 - 110 mg/dL Final       Audiogram Summary:    I independently reviewed the tracings of the {display:19197:o:"tympanogram","complete audiometric evaluation"} performed {display:19197:o:"today"}.  I reviewed the audiogram with the patient as well.  Pertinent findings include {display:20524:o:"binaural sloping HF sensorineural hearing loss with normal tymps","a normal study"}.  All lab results and imaging results have been reviewed.    Assessment:      No diagnosis found.           Plan:      There are no diagnoses linked to this encounter.     "

## 2023-12-19 ENCOUNTER — TELEPHONE (OUTPATIENT)
Dept: FAMILY MEDICINE | Facility: CLINIC | Age: 68
End: 2023-12-19
Payer: MEDICARE

## 2023-12-20 ENCOUNTER — OFFICE VISIT (OUTPATIENT)
Dept: SURGERY | Facility: CLINIC | Age: 68
End: 2023-12-20
Payer: MEDICARE

## 2023-12-20 VITALS
DIASTOLIC BLOOD PRESSURE: 94 MMHG | BODY MASS INDEX: 29.83 KG/M2 | OXYGEN SATURATION: 98 % | WEIGHT: 185.63 LBS | TEMPERATURE: 99 F | SYSTOLIC BLOOD PRESSURE: 165 MMHG | HEIGHT: 66 IN | HEART RATE: 80 BPM | RESPIRATION RATE: 16 BRPM

## 2023-12-20 DIAGNOSIS — L29.0 PRURITUS ANI: Primary | ICD-10-CM

## 2023-12-20 DIAGNOSIS — L29.0 ANAL ITCHING: ICD-10-CM

## 2023-12-20 PROCEDURE — 99999 PR PBB SHADOW E&M-EST. PATIENT-LVL IV: CPT | Mod: PBBFAC,,, | Performed by: COLON & RECTAL SURGERY

## 2023-12-20 PROCEDURE — 99204 PR OFFICE/OUTPT VISIT, NEW, LEVL IV, 45-59 MIN: ICD-10-PCS | Mod: S$PBB,,, | Performed by: COLON & RECTAL SURGERY

## 2023-12-20 PROCEDURE — 99999 PR PBB SHADOW E&M-EST. PATIENT-LVL IV: ICD-10-PCS | Mod: PBBFAC,,, | Performed by: COLON & RECTAL SURGERY

## 2023-12-20 PROCEDURE — 99214 OFFICE O/P EST MOD 30 MIN: CPT | Mod: PBBFAC,PN | Performed by: COLON & RECTAL SURGERY

## 2023-12-20 PROCEDURE — 99204 OFFICE O/P NEW MOD 45 MIN: CPT | Mod: S$PBB,,, | Performed by: COLON & RECTAL SURGERY

## 2023-12-20 RX ORDER — TAMSULOSIN HYDROCHLORIDE 0.4 MG/1
1 CAPSULE ORAL
COMMUNITY
Start: 2023-08-23 | End: 2024-01-10

## 2023-12-20 RX ORDER — SILDENAFIL 100 MG/1
100 TABLET, FILM COATED ORAL
COMMUNITY
Start: 2023-11-20 | End: 2024-01-10

## 2023-12-20 NOTE — PROGRESS NOTES
CRS Office/In-Patient History and Physical    Referring MD:   Romeo Gould, III  [address], [address]  [place] [address]    SUBJECTIVE:     History of Present Illness:  Patient is a 68 y.o. male diagnosed with Prostate Cancer after transrectal Bx in May 2023, developed anal itch following the procedure that has persisted despite topical agents. He has daily stools without leakage and uses a self-constructed bidet for anal hygiene. He drinks coffee, tea and alcohol (beer/wine) daily. He had prostate treatment in [place] and notes occasional leakage of fluid. He often notes an itch at night that might wake him up. He presents for evaluation and management.     Last Colonoscopy: 5/17/2023 (polyp)    I have reviewed the following images and labs:    Lab Results   Component Value Date    WBC 4.93 06/28/2023    HGB 14.5 06/28/2023    HCT 43.1 06/28/2023     06/28/2023    CHOL 258 (H) 01/05/2023    TRIG 181 (H) 01/05/2023    HDL 57 01/05/2023    ALT 23 01/05/2023    AST 21 01/05/2023     06/28/2023    K 4.2 06/28/2023     06/28/2023    CREATININE 1.0 06/28/2023    BUN 16 06/28/2023    CO2 24 06/28/2023    PSA 5.7 (H) 01/05/2023        Past Medical History:   Diagnosis Date    Hypertension     Mixed hyperlipidemia     Prostate cancer     Rosacea        Past Surgical History:   Procedure Laterality Date    ABLATION, PROSTATE, HIGH INTENSITY FOCUSED ULTRASOUND (HIFU)  07/27/2023    patient stated due to being dx with prostate ca    COLONOSCOPY  2019    COLONOSCOPY N/A 05/17/2023    Procedure: COLONOSCOPY;  Surgeon: Gerard Allen MD;  Location: [facility];  Service: Endoscopy;  Laterality: N/A;    PROSTATE BIOPSY N/A 04/25/2023    Procedure: BIOPSY, PROSTATE;  Surgeon: Thda Patricio MD;  Location: [facility];  Service: Urology;  Laterality: N/A;    right hand surgery      VASECTOMY  1996       Review of patient's allergies indicates:  No Known Allergies    Current Outpatient Medications on  File Prior to Visit   Medication Sig Dispense Refill    fluocinolone acetonide oiL 0.01 % Drop Place 3 drops in to ear canals twice daily for 7 days and then once daily as needed for breakthrough ear itching/dryness while on maintenance mineral oil. 20 mL 0    fluticasone propionate (FLONASE) 50 mcg/actuation nasal spray 1 spray (50 mcg total) by Each Nostril route 2 (two) times daily. Point up and slightly outward toward ear when spraying to avoid irritating nasal septum. 16 g 2    hydrocortisone (ANUSOL-HC) 2.5 % rectal cream Place rectally 2 (two) times daily. 28 g 1    latanoprost 0.005 % ophthalmic solution Place 1 drop into both eyes every evening.      levocetirizine (XYZAL) 5 MG tablet Take 1 tablet (5 mg total) by mouth every evening. 30 tablet 2    lisinopriL (PRINIVIL,ZESTRIL) 40 MG tablet Take 1 tablet (40 mg total) by mouth once daily. 90 tablet 1    tamsulosin (FLOMAX) 0.4 mg Cap Take 1 capsule by mouth.      triamcinolone acetonide 0.1% (KENALOG) 0.1 % cream       ALPRAZolam (XANAX) 0.5 MG tablet Take 1 tablet (0.5 mg total) by mouth daily as needed for Anxiety. (Patient not taking: Reported on 2023) 30 tablet 0    sildenafiL (VIAGRA) 100 MG tablet Take 100 mg by mouth.      tadalafiL (CIALIS) 20 MG Tab Take 1 tablet by mouth every other day as needed for erectile dysfunction. (Patient not taking: Reported on 2023) 30 tablet 1     No current facility-administered medications on file prior to visit.       No family history on file.    Social History     Tobacco Use    Smoking status: Former     Current packs/day: 0.00     Types: Cigarettes     Quit date:      Years since quittin.9    Smokeless tobacco: Former     Types: Chew     Quit date:    Substance Use Topics    Alcohol use: Yes     Comment: 3 drinks/day    Drug use: Never        Review of Systems:  Constitutional: Negative for fever, appetite change and unexpected weight change.  HENT: Negative for sore throat and trouble  "swallowing.  Eyes: Negative for visual disturbance.  Respiratory: Negative for chest tightness, shortness of breath and wheezing.  Cardiovascular: Negative for chest pain.  Gastrointestinal:  as per HPI  Genitourinary: Negative for dysuria, frequency and hematuria.  Musculoskeletal: Negative for myalgias, joint swelling and arthralgias.  Skin: Negative for color change and rash.   Neurological: Negative for syncope, weakness and headaches.  Psychiatric/Behavioral: Negative for confusion.      OBJECTIVE:     Vital Signs (Most Recent)  BP (!) 165/94 (BP Location: Right arm, Patient Position: Sitting, BP Method: Medium (Automatic))   Pulse 80   Temp 98.7 °F (37.1 °C) (Temporal)   Resp 16   Ht 5' 6" (1.676 m)   Wt 84.2 kg (185 lb 10 oz)   SpO2 98%   BMI 29.96 kg/m²            Physical Exam:  GENERAL:  Comfortable, in no acute distress.      SKIN: Non-jaundiced  EXTREMITIES:  No edema.     NEURO: CN II-XII intact; motor/sensory non-focal.                            HEENT EXAM:  Nonicteric.  No adenopathy.  Oropharynx is clear.               NECK:  Supple.                                                               LUNGS:  Clear.                                                               CARDIAC:  Regular rate and rhythm.  S1, S2.  No murmur.                      ABDOMEN:  Soft, positive bowel sounds, nontender.  No hepatosplenomegaly or masses.  No rebound or guarding.                                                Anorectal:   Preoperative Diagnosis: Itching  Postoperative Diagnosis: Same  Procedure: Anoscopy  Inspection: normal, evidence of topical agent  MARKO:  normal  Anoscopy: All four quadrants inspected.   Findings: normal early-stage hemorrhoids  The patient tolerated the procedure well.                                                     ASSESSMENT:   Pruritus ani       PLAN:   Standard conservative treatment (pamphlet given) advised including diet restrictions (coffee,tea, beer, wine) and anal " hygiene. Stop all topical agents. Trial of oral benadryl for severe itch (or baths), especially in the evening for the occasional nighttime itch.

## 2023-12-21 ENCOUNTER — PATIENT MESSAGE (OUTPATIENT)
Dept: SURGERY | Facility: CLINIC | Age: 68
End: 2023-12-21
Payer: MEDICARE

## 2023-12-25 ENCOUNTER — PATIENT MESSAGE (OUTPATIENT)
Dept: DERMATOLOGY | Facility: CLINIC | Age: 68
End: 2023-12-25
Payer: MEDICARE

## 2023-12-26 ENCOUNTER — PATIENT MESSAGE (OUTPATIENT)
Dept: SURGERY | Facility: CLINIC | Age: 68
End: 2023-12-26
Payer: MEDICARE

## 2023-12-26 ENCOUNTER — TELEPHONE (OUTPATIENT)
Dept: DERMATOLOGY | Facility: CLINIC | Age: 68
End: 2023-12-26
Payer: MEDICARE

## 2023-12-27 RX ORDER — LISINOPRIL 40 MG/1
40 TABLET ORAL
Qty: 30 TABLET | Refills: 0 | Status: SHIPPED | OUTPATIENT
Start: 2023-12-27 | End: 2024-01-10 | Stop reason: SDUPTHER

## 2023-12-27 NOTE — TELEPHONE ENCOUNTER
Refill Routing Note   Medication(s) are not appropriate for processing by [facility] for the following reason(s):        Required vitals abnormal  (!) 165/94       OR action(s):  Defer     Requires labs : Yes      Medication Therapy Plan: Vitals:  (!) 165/94      Appointments  past 12m or future 3m with PCP    Date Provider   Last Visit   6/6/2023 Romeo Gould III, MD   Next Visit   1/10/2024 Romeo Gould III, MD   ED visits in past 90 days: 0        Note composed:1:51 PM 12/27/2023

## 2023-12-27 NOTE — TELEPHONE ENCOUNTER
Care Due:                  Date            Visit Type   Department     Provider  --------------------------------------------------------------------------------                                MYCHART                              FOLLOWUP/OF  [facility]  Last Visit: 06-      [facility]  Next Visit: None Scheduled  None         None Found                                                            Last  Test          Frequency    Reason                     Performed    Due Date  --------------------------------------------------------------------------------    CMP.........  6 months...  hydrocortisone...........  01- 12-    [facility] Embedded Care Due Messages. Reference number: [id number].   12/27/2023 10:10:47 AM CST

## 2023-12-31 NOTE — TELEPHONE ENCOUNTER
No care due was identified.  [facility] Embedded Care Due Messages. Reference number: [id number].   12/30/2023 7:36:39 PM CST

## 2024-01-02 RX ORDER — LISINOPRIL 40 MG/1
40 TABLET ORAL
Qty: 90 TABLET | Refills: 0 | OUTPATIENT
Start: 2024-01-02

## 2024-01-04 ENCOUNTER — PATIENT MESSAGE (OUTPATIENT)
Dept: FAMILY MEDICINE | Facility: CLINIC | Age: 69
End: 2024-01-04
Payer: MEDICARE

## 2024-01-10 ENCOUNTER — OFFICE VISIT (OUTPATIENT)
Dept: FAMILY MEDICINE | Facility: CLINIC | Age: 69
End: 2024-01-10
Payer: MEDICARE

## 2024-01-10 VITALS
DIASTOLIC BLOOD PRESSURE: 78 MMHG | OXYGEN SATURATION: 97 % | WEIGHT: 183.56 LBS | HEIGHT: 66 IN | SYSTOLIC BLOOD PRESSURE: 118 MMHG | HEART RATE: 74 BPM | BODY MASS INDEX: 29.5 KG/M2

## 2024-01-10 DIAGNOSIS — J84.10 GRANULOMATOUS LUNG DISEASE: ICD-10-CM

## 2024-01-10 DIAGNOSIS — Z77.090 ASBESTOS EXPOSURE: ICD-10-CM

## 2024-01-10 DIAGNOSIS — I10 HYPERTENSION, ESSENTIAL: Primary | ICD-10-CM

## 2024-01-10 DIAGNOSIS — C61 PROSTATE CANCER: ICD-10-CM

## 2024-01-10 DIAGNOSIS — Z13.1 SCREENING FOR DIABETES MELLITUS (DM): ICD-10-CM

## 2024-01-10 DIAGNOSIS — E78.5 HYPERLIPIDEMIA, UNSPECIFIED HYPERLIPIDEMIA TYPE: ICD-10-CM

## 2024-01-10 DIAGNOSIS — Z87.891 FORMER SMOKER: ICD-10-CM

## 2024-01-10 DIAGNOSIS — R79.9 ABNORMAL FINDING OF BLOOD CHEMISTRY, UNSPECIFIED: ICD-10-CM

## 2024-01-10 PROCEDURE — 99999 PR PBB SHADOW E&M-EST. PATIENT-LVL IV: CPT | Mod: PBBFAC,,, | Performed by: FAMILY MEDICINE

## 2024-01-10 PROCEDURE — 99214 OFFICE O/P EST MOD 30 MIN: CPT | Mod: PBBFAC,PN | Performed by: FAMILY MEDICINE

## 2024-01-10 PROCEDURE — 99214 OFFICE O/P EST MOD 30 MIN: CPT | Mod: S$PBB,,, | Performed by: FAMILY MEDICINE

## 2024-01-10 RX ORDER — LISINOPRIL 40 MG/1
40 TABLET ORAL DAILY
Qty: 90 TABLET | Refills: 1 | Status: SHIPPED | OUTPATIENT
Start: 2024-01-10

## 2024-01-10 RX ORDER — TADALAFIL 10 MG/1
10 TABLET ORAL DAILY
Qty: 30 TABLET | Refills: 5 | Status: SHIPPED | OUTPATIENT
Start: 2024-01-10

## 2024-01-11 NOTE — PROGRESS NOTES
Subjective:       Patient ID: Phil Verduzco is a 68 y.o. male.    Chief Complaint: Annual Exam    Some granulomatous lung disease on CT scan.  Former smoker.  Asbestos exposure also.  Hypertension is under good control.  No chest pain or palpitations.  Exercising some on a regular basis.  Former smoker quit back in 1992.  Hyperlipidemia also.  Prostate cancer ED issues.  Had ultrasound therapy of the prostate in [place].  He is to follow-up in February with a physician there.  Due for a PSA.  In general he has been feeling well.  No pulmonary complaints no cardiovascular complaints.  GI been diagnosed with pruritus ani.  He is modified his diet and this is helping.    Physical examination.  Vital signs noted.  Neck without bruit no adenopathy.  Chest clear.  Heart regular rate rhythm.  Abdomen bowel sounds are positive soft nontender.  Extremities without edema positive pulses.      Review of Systems    Objective:      Physical Exam    Assessment:       1. Hypertension, essential    2. Hyperlipidemia, unspecified hyperlipidemia type    3. Prostate cancer    4. Former smoker    5. Asbestos exposure    6. BMI 29.0-29.9,adult    7. Screening for diabetes mellitus (DM)    8. Abnormal finding of blood chemistry, unspecified    9. Granulomatous lung disease        Plan:       Hypertension, essential  -     CBC Auto Differential; Future; Expected date: 01/10/2024  -     Comprehensive Metabolic Panel; Future; Expected date: 01/10/2024  -     Hemoglobin A1C; Future; Expected date: 01/10/2024    Hyperlipidemia, unspecified hyperlipidemia type  -     Lipid Panel; Future; Expected date: 01/10/2024  -     Hemoglobin A1C; Future; Expected date: 01/10/2024    Prostate cancer  -     PROSTATE SPECIFIC ANTIGEN, DIAGNOSTIC; Future; Expected date: 01/10/2024    Former smoker    Asbestos exposure  -     CBC Auto Differential; Future; Expected date: 01/10/2024    BMI 29.0-29.9,adult    Screening for diabetes mellitus (DM)  -      Hemoglobin A1C; Future; Expected date: 01/10/2024    Abnormal finding of blood chemistry, unspecified  -     Hemoglobin A1C; Future; Expected date: 01/10/2024    Granulomatous lung disease    Other orders  -     tadalafiL (CIALIS) 10 MG tablet; Take 1 tablet (10 mg total) by mouth once daily. Take 1 tablet by mouth every other day as needed for erectile dysfunction.  Dispense: 30 tablet; Refill: 5  -     lisinopriL (PRINIVIL,ZESTRIL) 40 MG tablet; Take 1 tablet (40 mg total) by mouth once daily.  Dispense: 90 tablet; Refill: 1    PSA lipid CMP CBC ordered.  Declines all vaccines.  A1c ordered.  Follow-up in 3 months.  Refill lisinopril.  Cialis 10 mg try taking this daily or every other day.  He will send the prescription to [facility].

## 2024-01-15 ENCOUNTER — PATIENT MESSAGE (OUTPATIENT)
Dept: SURGERY | Facility: CLINIC | Age: 69
End: 2024-01-15
Payer: MEDICARE

## 2024-01-17 ENCOUNTER — LAB VISIT (OUTPATIENT)
Dept: LAB | Facility: HOSPITAL | Age: 69
End: 2024-01-17
Attending: FAMILY MEDICINE
Payer: MEDICARE

## 2024-01-17 DIAGNOSIS — C61 PROSTATE CANCER: ICD-10-CM

## 2024-01-17 DIAGNOSIS — I10 HYPERTENSION, ESSENTIAL: ICD-10-CM

## 2024-01-17 DIAGNOSIS — Z77.090 ASBESTOS EXPOSURE: ICD-10-CM

## 2024-01-17 DIAGNOSIS — Z13.1 SCREENING FOR DIABETES MELLITUS (DM): ICD-10-CM

## 2024-01-17 DIAGNOSIS — E78.5 HYPERLIPIDEMIA, UNSPECIFIED HYPERLIPIDEMIA TYPE: ICD-10-CM

## 2024-01-17 DIAGNOSIS — R79.9 ABNORMAL FINDING OF BLOOD CHEMISTRY, UNSPECIFIED: ICD-10-CM

## 2024-01-17 LAB
ALBUMIN SERPL BCP-MCNC: 4.3 G/DL (ref 3.5–5.2)
ALP SERPL-CCNC: 44 U/L (ref 55–135)
ALT SERPL W/O P-5'-P-CCNC: 24 U/L (ref 10–44)
ANION GAP SERPL CALC-SCNC: 8 MMOL/L (ref 8–16)
AST SERPL-CCNC: 18 U/L (ref 10–40)
BASOPHILS # BLD AUTO: 0.02 K/UL (ref 0–0.2)
BASOPHILS NFR BLD: 0.4 % (ref 0–1.9)
BILIRUB SERPL-MCNC: 0.9 MG/DL (ref 0.1–1)
BUN SERPL-MCNC: 13 MG/DL (ref 8–23)
CALCIUM SERPL-MCNC: 9 MG/DL (ref 8.7–10.5)
CHLORIDE SERPL-SCNC: 107 MMOL/L (ref 95–110)
CHOLEST SERPL-MCNC: 206 MG/DL (ref 120–199)
CHOLEST/HDLC SERPL: 5 {RATIO} (ref 2–5)
CO2 SERPL-SCNC: 27 MMOL/L (ref 23–29)
COMPLEXED PSA SERPL-MCNC: 0.56 NG/ML (ref 0–4)
CREAT SERPL-MCNC: 0.9 MG/DL (ref 0.5–1.4)
DIFFERENTIAL METHOD BLD: ABNORMAL
EOSINOPHIL # BLD AUTO: 0.1 K/UL (ref 0–0.5)
EOSINOPHIL NFR BLD: 2.2 % (ref 0–8)
ERYTHROCYTE [DISTWIDTH] IN BLOOD BY AUTOMATED COUNT: 11.7 % (ref 11.5–14.5)
EST. GFR  (NO RACE VARIABLE): >60 ML/MIN/1.73 M^2
ESTIMATED AVG GLUCOSE: 108 MG/DL (ref 68–131)
GLUCOSE SERPL-MCNC: 98 MG/DL (ref 70–110)
HBA1C MFR BLD: 5.4 % (ref 4.5–6.2)
HCT VFR BLD AUTO: 41.8 % (ref 40–54)
HDLC SERPL-MCNC: 41 MG/DL (ref 40–75)
HDLC SERPL: 19.9 % (ref 20–50)
HGB BLD-MCNC: 14.1 G/DL (ref 14–18)
IMM GRANULOCYTES # BLD AUTO: 0.03 K/UL (ref 0–0.04)
IMM GRANULOCYTES NFR BLD AUTO: 0.6 % (ref 0–0.5)
LDLC SERPL CALC-MCNC: 134.4 MG/DL (ref 63–159)
LYMPHOCYTES # BLD AUTO: 1.2 K/UL (ref 1–4.8)
LYMPHOCYTES NFR BLD: 24.4 % (ref 18–48)
MCH RBC QN AUTO: 30.8 PG (ref 27–31)
MCHC RBC AUTO-ENTMCNC: 33.7 G/DL (ref 32–36)
MCV RBC AUTO: 91 FL (ref 82–98)
MONOCYTES # BLD AUTO: 0.4 K/UL (ref 0.3–1)
MONOCYTES NFR BLD: 8.6 % (ref 4–15)
NEUTROPHILS # BLD AUTO: 3.2 K/UL (ref 1.8–7.7)
NEUTROPHILS NFR BLD: 63.8 % (ref 38–73)
NONHDLC SERPL-MCNC: 165 MG/DL
NRBC BLD-RTO: 0 /100 WBC
PLATELET # BLD AUTO: 214 K/UL (ref 150–450)
PMV BLD AUTO: 9.2 FL (ref 9.2–12.9)
POTASSIUM SERPL-SCNC: 4.4 MMOL/L (ref 3.5–5.1)
PROT SERPL-MCNC: 7 G/DL (ref 6–8.4)
RBC # BLD AUTO: 4.58 M/UL (ref 4.6–6.2)
SODIUM SERPL-SCNC: 142 MMOL/L (ref 136–145)
TRIGL SERPL-MCNC: 153 MG/DL (ref 30–150)
WBC # BLD AUTO: 5 K/UL (ref 3.9–12.7)

## 2024-01-17 PROCEDURE — 83036 HEMOGLOBIN GLYCOSYLATED A1C: CPT | Performed by: FAMILY MEDICINE

## 2024-01-17 PROCEDURE — 36415 COLL VENOUS BLD VENIPUNCTURE: CPT | Performed by: FAMILY MEDICINE

## 2024-01-17 PROCEDURE — 84153 ASSAY OF PSA TOTAL: CPT | Performed by: FAMILY MEDICINE

## 2024-01-17 PROCEDURE — 80061 LIPID PANEL: CPT | Performed by: FAMILY MEDICINE

## 2024-01-17 PROCEDURE — 85025 COMPLETE CBC W/AUTO DIFF WBC: CPT | Performed by: FAMILY MEDICINE

## 2024-01-17 PROCEDURE — 80053 COMPREHEN METABOLIC PANEL: CPT | Performed by: FAMILY MEDICINE

## 2024-01-22 ENCOUNTER — TELEPHONE (OUTPATIENT)
Dept: FAMILY MEDICINE | Facility: CLINIC | Age: 69
End: 2024-01-22
Payer: MEDICARE

## 2024-01-22 ENCOUNTER — PATIENT MESSAGE (OUTPATIENT)
Dept: FAMILY MEDICINE | Facility: CLINIC | Age: 69
End: 2024-01-22
Payer: MEDICARE

## 2024-02-17 ENCOUNTER — PATIENT MESSAGE (OUTPATIENT)
Dept: UROLOGY | Facility: CLINIC | Age: 69
End: 2024-02-17
Payer: MEDICARE

## 2024-02-17 ENCOUNTER — PATIENT MESSAGE (OUTPATIENT)
Dept: SURGERY | Facility: CLINIC | Age: 69
End: 2024-02-17
Payer: MEDICARE

## 2024-02-27 DIAGNOSIS — Z00.00 ENCOUNTER FOR MEDICARE ANNUAL WELLNESS EXAM: ICD-10-CM

## 2024-03-15 ENCOUNTER — TELEPHONE (OUTPATIENT)
Dept: FAMILY MEDICINE | Facility: CLINIC | Age: 69
End: 2024-03-15
Payer: MEDICARE

## 2024-03-15 ENCOUNTER — PATIENT MESSAGE (OUTPATIENT)
Dept: FAMILY MEDICINE | Facility: CLINIC | Age: 69
End: 2024-03-15
Payer: MEDICARE

## 2024-03-15 NOTE — TELEPHONE ENCOUNTER
Made apt Tuesday 9:20. Has diarrhea when he try to pee. On augmentin 875 mg and doxycyline 100 mg bid. Started Monday night. Monday morning dx bacterial pneumonia. Got 2 rounds of IV antibiotics. In [facility]

## 2024-03-18 ENCOUNTER — PATIENT MESSAGE (OUTPATIENT)
Dept: FAMILY MEDICINE | Facility: CLINIC | Age: 69
End: 2024-03-18
Payer: MEDICARE

## 2024-03-19 ENCOUNTER — OFFICE VISIT (OUTPATIENT)
Dept: FAMILY MEDICINE | Facility: CLINIC | Age: 69
End: 2024-03-19
Payer: MEDICARE

## 2024-03-19 VITALS
SYSTOLIC BLOOD PRESSURE: 123 MMHG | OXYGEN SATURATION: 99 % | TEMPERATURE: 98 F | HEART RATE: 66 BPM | WEIGHT: 177.56 LBS | DIASTOLIC BLOOD PRESSURE: 82 MMHG | BODY MASS INDEX: 28.66 KG/M2

## 2024-03-19 DIAGNOSIS — A41.9 SEPSIS, DUE TO UNSPECIFIED ORGANISM, UNSPECIFIED WHETHER ACUTE ORGAN DYSFUNCTION PRESENT: ICD-10-CM

## 2024-03-19 DIAGNOSIS — I70.0 AORTIC ATHEROSCLEROSIS: ICD-10-CM

## 2024-03-19 DIAGNOSIS — J18.9 PNEUMONIA DUE TO INFECTIOUS ORGANISM, UNSPECIFIED LATERALITY, UNSPECIFIED PART OF LUNG: Primary | ICD-10-CM

## 2024-03-19 DIAGNOSIS — I10 ESSENTIAL HYPERTENSION: ICD-10-CM

## 2024-03-19 DIAGNOSIS — Z87.891 FORMER SMOKER: ICD-10-CM

## 2024-03-19 DIAGNOSIS — C61 CA OF PROSTATE: ICD-10-CM

## 2024-03-19 PROCEDURE — 90677 PCV20 VACCINE IM: CPT | Mod: PBBFAC,PN

## 2024-03-19 PROCEDURE — 99999PBSHW PNEUMOCOCCAL CONJUGATE VACCINE 20-VALENT: Mod: PBBFAC,,,

## 2024-03-19 PROCEDURE — 99999 PR PBB SHADOW E&M-EST. PATIENT-LVL IV: CPT | Mod: PBBFAC,,, | Performed by: FAMILY MEDICINE

## 2024-03-19 PROCEDURE — 99214 OFFICE O/P EST MOD 30 MIN: CPT | Mod: PBBFAC,PN,25 | Performed by: FAMILY MEDICINE

## 2024-03-19 PROCEDURE — 99214 OFFICE O/P EST MOD 30 MIN: CPT | Mod: S$PBB,,, | Performed by: FAMILY MEDICINE

## 2024-03-19 RX ORDER — AMOXICILLIN AND CLAVULANATE POTASSIUM 875; 125 MG/1; MG/1
1 TABLET, FILM COATED ORAL EVERY 12 HOURS
COMMUNITY
Start: 2024-03-11 | End: 2024-05-05

## 2024-03-19 RX ORDER — DOXYCYCLINE 100 MG/1
100 CAPSULE ORAL EVERY 12 HOURS
COMMUNITY
Start: 2024-03-11 | End: 2024-05-05

## 2024-03-20 PROBLEM — I70.0 AORTIC ATHEROSCLEROSIS: Status: ACTIVE | Noted: 2024-03-20

## 2024-03-20 NOTE — PROGRESS NOTES
Subjective:       Patient ID: Phil Verduzco is a 68 y.o. male.    Chief Complaint: Cough, Shortness of Breath, and Headache    Twelve days ago became ill in [place].  Fever up to 105° and shaking chills rigors.  Hospitalized in [facility] no sputum.  Found to have right lower lobe pneumonia.  Given IV antibiotics.  Believes he was told that a blood culture was positive he had sepsis.  Discharged on Augmentin and Vibramycin.  Ten days of medication.  Two days left.  Some loose stool.  But no severe diarrhea.  Discussed Clostridium difficile.  He has not coughing much.  A little short of breath on stairs.  No fever.  He has previously refused the Prevnar.  Hypertension is controlled.  Prostate cancer status post treatment in [place].  PSA 0.56 now.  Was 0.6 and 0.63 prior.  Former smoker.  BMI of 29 down 6 lb.  A1c 5.4 CMP okay.  Cholesterol 266 HDL 41 .    Physical examination.  Vital signs noted.  Neck without bruit no adenopathy.  Chest clear.  Heart regular rate and rhythm.  Abdomen nontender.  Extremities without edema.        Objective:        Assessment:       1. Pneumonia due to infectious organism, unspecified laterality, unspecified part of lung    2. Essential hypertension    3. CA of prostate    4. Former smoker    5. BMI 29.0-29.9,adult    6. Sepsis, due to unspecified organism, unspecified whether acute organ dysfunction present    7. Aortic atherosclerosis        Plan:       Pneumonia due to infectious organism, unspecified laterality, unspecified part of lung  -     X-Ray Chest PA And Lateral; Future; Expected date: 04/19/2024    Essential hypertension    CA of prostate    Former smoker    BMI 29.0-29.9,adult    Sepsis, due to unspecified organism, unspecified whether acute organ dysfunction present    Aortic atherosclerosis    Other orders  -     (In Office Administered) Pneumococcal Conjugate Vaccine (20 Valent) (IM) (Preferred)      Discussed Prevnar 20 with him.  He will go ahead and  take 1 today.  Chest x-ray and a month since his long sound good today.  Urology follow-up as planned.  Complete his antibiotics.  Follow-up or call me immediately if he does not have resolution of his diarrhea with completion of the antibiotics.  Will need to check him for C diff.

## 2024-03-21 ENCOUNTER — PATIENT MESSAGE (OUTPATIENT)
Dept: FAMILY MEDICINE | Facility: CLINIC | Age: 69
End: 2024-03-21
Payer: MEDICARE

## 2024-04-05 ENCOUNTER — HOSPITAL ENCOUNTER (OUTPATIENT)
Dept: RADIOLOGY | Facility: HOSPITAL | Age: 69
Discharge: HOME | End: 2024-04-05
Attending: FAMILY MEDICINE
Payer: MEDICARE

## 2024-04-05 ENCOUNTER — PATIENT MESSAGE (OUTPATIENT)
Dept: FAMILY MEDICINE | Facility: CLINIC | Age: 69
End: 2024-04-05

## 2024-04-05 ENCOUNTER — OFFICE VISIT (OUTPATIENT)
Dept: FAMILY MEDICINE | Facility: CLINIC | Age: 69
End: 2024-04-05
Payer: MEDICARE

## 2024-04-05 ENCOUNTER — TELEPHONE (OUTPATIENT)
Dept: FAMILY MEDICINE | Facility: CLINIC | Age: 69
End: 2024-04-05
Payer: MEDICARE

## 2024-04-05 VITALS
DIASTOLIC BLOOD PRESSURE: 82 MMHG | RESPIRATION RATE: 17 BRPM | HEIGHT: 66 IN | HEART RATE: 81 BPM | OXYGEN SATURATION: 97 % | SYSTOLIC BLOOD PRESSURE: 150 MMHG | WEIGHT: 176.81 LBS | TEMPERATURE: 99 F | BODY MASS INDEX: 28.42 KG/M2

## 2024-04-05 DIAGNOSIS — J18.9 PNEUMONIA DUE TO INFECTIOUS ORGANISM, UNSPECIFIED LATERALITY, UNSPECIFIED PART OF LUNG: ICD-10-CM

## 2024-04-05 DIAGNOSIS — I10 HYPERTENSION, ESSENTIAL: Primary | ICD-10-CM

## 2024-04-05 DIAGNOSIS — J06.9 UPPER RESPIRATORY TRACT INFECTION, UNSPECIFIED TYPE: ICD-10-CM

## 2024-04-05 LAB
CTP QC/QA: YES
SARS-COV-2 RDRP RESP QL NAA+PROBE: NEGATIVE

## 2024-04-05 PROCEDURE — 99999 PR PBB SHADOW E&M-EST. PATIENT-LVL IV: CPT | Mod: PBBFAC,,, | Performed by: FAMILY MEDICINE

## 2024-04-05 PROCEDURE — 99213 OFFICE O/P EST LOW 20 MIN: CPT | Mod: S$PBB,,, | Performed by: FAMILY MEDICINE

## 2024-04-05 PROCEDURE — 99999PBSHW: Mod: PBBFAC,,,

## 2024-04-05 PROCEDURE — 99214 OFFICE O/P EST MOD 30 MIN: CPT | Mod: PBBFAC,PN | Performed by: FAMILY MEDICINE

## 2024-04-05 PROCEDURE — 71046 X-RAY EXAM CHEST 2 VIEWS: CPT | Mod: TC

## 2024-04-05 PROCEDURE — 87635 SARS-COV-2 COVID-19 AMP PRB: CPT | Mod: PBBFAC,PN | Performed by: FAMILY MEDICINE

## 2024-04-05 RX ORDER — AZELASTINE 1 MG/ML
1 SPRAY, METERED NASAL 2 TIMES DAILY
COMMUNITY
End: 2024-04-05 | Stop reason: SDUPTHER

## 2024-04-05 RX ORDER — AMLODIPINE BESYLATE 2.5 MG/1
2.5 TABLET ORAL DAILY
Qty: 30 TABLET | Refills: 0 | Status: SHIPPED | OUTPATIENT
Start: 2024-04-05 | End: 2024-05-24 | Stop reason: SDUPTHER

## 2024-04-05 RX ORDER — AMLODIPINE BESYLATE 2.5 MG/1
2.5 TABLET ORAL DAILY
COMMUNITY
End: 2024-04-05 | Stop reason: SDUPTHER

## 2024-04-05 RX ORDER — LEVOFLOXACIN 500 MG/1
500 TABLET, FILM COATED ORAL DAILY
Qty: 10 TABLET | Refills: 0 | Status: SHIPPED | OUTPATIENT
Start: 2024-04-05 | End: 2024-05-05

## 2024-04-05 RX ORDER — AZELASTINE 1 MG/ML
1 SPRAY, METERED NASAL 2 TIMES DAILY
Qty: 30 ML | Refills: 0 | Status: SHIPPED | OUTPATIENT
Start: 2024-04-05 | End: 2024-05-05

## 2024-04-05 NOTE — TELEPHONE ENCOUNTER
----- Message from Carolinastorm Nickanai sent at 4/5/2024  3:58 PM CDT -----  Regarding: advise  Contact: pt  Type: Needs Medical Advice  Who Called:  patient   Symptoms (please be specific):   How long has patient had these symptoms:    Pharmacy name and phone #:   [facility] [address] [place], [place] - [address]  [address]  [facility] [place] [address]  Phone: [phone] Fax: [phone]      Best Call Back Number: [phone]    Additional Information: Michelle glass pt states there prescripton for dr. ortiz still has not been sent are u available to advise MRN: [id number] BURAK COREA

## 2024-04-07 NOTE — PROGRESS NOTES
Subjective:       Patient ID: Phil Verduzco is a 68 y.o. male.    Chief Complaint: URI    Work-in for follow-up of his pneumonia.  Coughing more nose running.  Little worse the past few days.  Ears are okay.  No fever chills.  Clear drainage.  But coughing.  Diarrhea gone now.  Little sputum the last 2 days.  Completed his Augmentin and Vibramycin.  Use Zyrtec without relief.  And Coricidin HBP.    Physical examination.  Vital signs noted.  No acute distress.  Tympanic membranes are without erythema.  Nose congested.  Pharynx without erythema neck is without adenopathy.  Chest clear.  Heart regular rate rhythm.    COVID testing is negative.        Objective:        Assessment:       1. Hypertension, essential    2. Upper respiratory tract infection, unspecified type    3. Pneumonia due to infectious organism, unspecified laterality, unspecified part of lung        Plan:       Hypertension, essential    Upper respiratory tract infection, unspecified type  -     POCT COVID-19 Rapid Screening  -     X-Ray Chest PA And Lateral; Future; Expected date: 04/05/2024    Pneumonia due to infectious organism, unspecified laterality, unspecified part of lung  -     POCT COVID-19 Rapid Screening  -     X-Ray Chest PA And Lateral; Future; Expected date: 04/05/2024    Other orders  -     levoFLOXacin (LEVAQUIN) 500 MG tablet; Take 1 tablet (500 mg total) by mouth once daily.  Dispense: 10 tablet; Refill: 0  -     azelastine (ASTELIN) 137 mcg (0.1 %) nasal spray; 1 spray (137 mcg total) by Nasal route 2 (two) times daily.  Dispense: 30 mL; Refill: 0  -     amLODIPine (NORVASC) 2.5 MG tablet; Take 1 tablet (2.5 mg total) by mouth once daily.  Dispense: 30 tablet; Refill: 0    Repeat chest x-ray.  Astelin nasal spray b.i.d..  Levaquin 500 daily for 10 days.  Delsym p.r.n. cough.  Norvasc 2.5 mg daily added.  Follow-up in 3-4 weeks.

## 2024-04-08 ENCOUNTER — LAB VISIT (OUTPATIENT)
Dept: LAB | Facility: HOSPITAL | Age: 69
End: 2024-04-08
Attending: UROLOGY
Payer: MEDICARE

## 2024-04-08 DIAGNOSIS — C61 MALIGNANT NEOPLASM OF PROSTATE: Primary | ICD-10-CM

## 2024-04-08 LAB — COMPLEXED PSA SERPL-MCNC: 0.82 NG/ML (ref 0–4)

## 2024-04-08 PROCEDURE — 36415 COLL VENOUS BLD VENIPUNCTURE: CPT | Performed by: UROLOGY

## 2024-04-08 PROCEDURE — 84153 ASSAY OF PSA TOTAL: CPT | Performed by: UROLOGY

## 2024-05-03 ENCOUNTER — OFFICE VISIT (OUTPATIENT)
Dept: FAMILY MEDICINE | Facility: CLINIC | Age: 69
End: 2024-05-03
Payer: MEDICARE

## 2024-05-03 VITALS
RESPIRATION RATE: 18 BRPM | TEMPERATURE: 98 F | SYSTOLIC BLOOD PRESSURE: 141 MMHG | BODY MASS INDEX: 28.45 KG/M2 | HEART RATE: 76 BPM | DIASTOLIC BLOOD PRESSURE: 73 MMHG | HEIGHT: 66 IN | OXYGEN SATURATION: 96 % | WEIGHT: 177 LBS

## 2024-05-03 DIAGNOSIS — C61 CA OF PROSTATE: ICD-10-CM

## 2024-05-03 DIAGNOSIS — I10 HYPERTENSION, ESSENTIAL: Primary | ICD-10-CM

## 2024-05-03 PROCEDURE — 99999 PR PBB SHADOW E&M-EST. PATIENT-LVL IV: CPT | Mod: PBBFAC,,, | Performed by: FAMILY MEDICINE

## 2024-05-03 PROCEDURE — 99214 OFFICE O/P EST MOD 30 MIN: CPT | Mod: PBBFAC,PN | Performed by: FAMILY MEDICINE

## 2024-05-03 PROCEDURE — G2211 COMPLEX E/M VISIT ADD ON: HCPCS | Mod: S$PBB,,, | Performed by: FAMILY MEDICINE

## 2024-05-03 PROCEDURE — 99213 OFFICE O/P EST LOW 20 MIN: CPT | Mod: S$PBB,,, | Performed by: FAMILY MEDICINE

## 2024-05-03 NOTE — PROGRESS NOTES
SUBJECTIVE:    Patient ID: Phil Verduzco is a 68 y.o. male.    Chief Complaint: Follow-up (1 month)    Phil Verduzco is a 68 y.o. male with Medical History of HTN, Prostate cancer who presents for a follow-up of hypertension. States his home blood pressure has been around: 126/68, 145/73, 130/64, 156/75, 151/70, and 141/73. Today his BP is 142/78. Patient notes he has not been taking the Amlodipine, but is taking the 40 mg Lisinopril. Advised patient to take both. PSA is 0.82. Cardiovascular good. No chest pain. No palpitations. BMI is 28.5.        Lab Visit on 04/08/2024   Component Date Value Ref Range Status    PSA Diagnostic 04/08/2024 0.82  0.00 - 4.00 ng/mL Final   Office Visit on 04/05/2024   Component Date Value Ref Range Status    POC Rapid COVID 04/05/2024 Negative  Negative Final     Acceptable 04/05/2024 Yes   Final   Lab Visit on 01/17/2024   Component Date Value Ref Range Status    Cholesterol 01/17/2024 206 (H)  120 - 199 mg/dL Final    Triglycerides 01/17/2024 153 (H)  30 - 150 mg/dL Final    HDL 01/17/2024 41  40 - 75 mg/dL Final    LDL Cholesterol 01/17/2024 134.4  63.0 - 159.0 mg/dL Final    HDL/Cholesterol Ratio 01/17/2024 19.9 (L)  20.0 - 50.0 % Final    Total Cholesterol/HDL Ratio 01/17/2024 5.0  2.0 - 5.0 Final    Non-HDL Cholesterol 01/17/2024 165  mg/dL Final    WBC 01/17/2024 5.00  3.90 - 12.70 K/uL Final    RBC 01/17/2024 4.58 (L)  4.60 - 6.20 M/uL Final    Hemoglobin 01/17/2024 14.1  14.0 - 18.0 g/dL Final    Hematocrit 01/17/2024 41.8  40.0 - 54.0 % Final    MCV 01/17/2024 91  82 - 98 fL Final    MCH 01/17/2024 30.8  27.0 - 31.0 pg Final    MCHC 01/17/2024 33.7  32.0 - 36.0 g/dL Final    RDW 01/17/2024 11.7  11.5 - 14.5 % Final    Platelets 01/17/2024 214  150 - 450 K/uL Final    MPV 01/17/2024 9.2  9.2 - 12.9 fL Final    Immature Granulocytes 01/17/2024 0.6 (H)  0.0 - 0.5 % Final    Gran # (ANC) 01/17/2024 3.2  1.8 - 7.7 K/uL Final    Immature Grans (Abs) 01/17/2024  0.03  0.00 - 0.04 K/uL Final    Lymph # 01/17/2024 1.2  1.0 - 4.8 K/uL Final    Mono # 01/17/2024 0.4  0.3 - 1.0 K/uL Final    Eos # 01/17/2024 0.1  0.0 - 0.5 K/uL Final    Baso # 01/17/2024 0.02  0.00 - 0.20 K/uL Final    nRBC 01/17/2024 0  0 /100 WBC Final    Gran % 01/17/2024 63.8  38.0 - 73.0 % Final    Lymph % 01/17/2024 24.4  18.0 - 48.0 % Final    Mono % 01/17/2024 8.6  4.0 - 15.0 % Final    Eosinophil % 01/17/2024 2.2  0.0 - 8.0 % Final    Basophil % 01/17/2024 0.4  0.0 - 1.9 % Final    Differential Method 01/17/2024 Automated   Final    Sodium 01/17/2024 142  136 - 145 mmol/L Final    Potassium 01/17/2024 4.4  3.5 - 5.1 mmol/L Final    Chloride 01/17/2024 107  95 - 110 mmol/L Final    CO2 01/17/2024 27  23 - 29 mmol/L Final    Glucose 01/17/2024 98  70 - 110 mg/dL Final    BUN 01/17/2024 13  8 - 23 mg/dL Final    Creatinine 01/17/2024 0.9  0.5 - 1.4 mg/dL Final    Calcium 01/17/2024 9.0  8.7 - 10.5 mg/dL Final    Total Protein 01/17/2024 7.0  6.0 - 8.4 g/dL Final    Albumin 01/17/2024 4.3  3.5 - 5.2 g/dL Final    Total Bilirubin 01/17/2024 0.9  0.1 - 1.0 mg/dL Final    Alkaline Phosphatase 01/17/2024 44 (L)  55 - 135 U/L Final    AST 01/17/2024 18  10 - 40 U/L Final    ALT 01/17/2024 24  10 - 44 U/L Final    eGFR 01/17/2024 >60.0  >60 mL/min/1.73 m^2 Final    Anion Gap 01/17/2024 8  8 - 16 mmol/L Final    Hemoglobin A1C 01/17/2024 5.4  4.5 - 6.2 % Final    Estimated Avg Glucose 01/17/2024 108  68 - 131 mg/dL Final    PSA Diagnostic 01/17/2024 0.56  0.00 - 4.00 ng/mL Final   Lab Visit on 10/30/2023   Component Date Value Ref Range Status    PSA Diagnostic 10/30/2023 0.60  0.00 - 4.00 ng/mL Final   Office Visit on 08/14/2023   Component Date Value Ref Range Status    Final Pathologic Diagnosis 08/14/2023    Final                    Value:1. Skin, right paramedian upper forehead, shave biopsy:   - DESMOPLASTIC ADNEXAL TUMOR, FAVOR DESMOPLASTIC TRICHOEPITHELIOMA, INCOMPLETELY EXCISED (SEE COMMENT).    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  Although benign, these tumors can rarely exhibit malignant transformation to trichoblastic carcinoma or basal cell carcinoma.  Therefore, conservative   re-excision is recommended.       Gross 08/14/2023    Final                    Value:Pathology ID:  [id number]  Patient ID:  [id number]  The specimen is received in formalin labeled &quot;right paramedian upper forehead&quot;.  The specimen is a shave biopsy of tan skin measuring 0.6 x 0.6 cm .  The specimen is bisected,  inked blue at the resection margin and submitted entirely in   cassette [id number].  Chris Ellis      Microscopic Exam 08/14/2023    Final                    Value:Within the visualized dermis, there are narrow and irregular epithelial cords of basaloid appearing cells with suggestive trichilemmal differentiation embedded in a densely hyalinized desmoplastic stroma.  The tumor stains strongly and diffusely positive   for both cytokeratin 5/6 and P 63 immunohistochemical stains it shows weak, sparse positivity for Tristen EP4 immunohistochemical stain.  Cytokeratin 20 immunohistochemical stain highlights a single positive Merkel cell within the tumor.  CEA and EMA   immunohistochemical stains are negative.  Appropriately reactive controls were reviewed.      Comment 08/14/2023 This case was reviewed by Suraj Pearce MD who agrees with the above diagnosis.   Final    Disclaimer 08/14/2023 Unless the case is a 'gross only' or additional testing only, the final diagnosis for each specimen is based on a microscopic examination of appropriate tissue sections.   Final   Lab Visit on 08/10/2023   Component Date Value Ref Range Status    Urine Culture, Routine 08/10/2023 Multiple organisms isolated. None in predominance.  Repeat if   Final    Urine Culture, Routine 08/10/2023 clinically necessary.   Final   Lab Visit on 06/28/2023   Component Date Value Ref Range Status     WBC 06/28/2023 4.93  3.90 - 12.70 K/uL Final    RBC 06/28/2023 4.62  4.60 - 6.20 M/uL Final    Hemoglobin 06/28/2023 14.5  14.0 - 18.0 g/dL Final    Hematocrit 06/28/2023 43.1  40.0 - 54.0 % Final    MCV 06/28/2023 93  82 - 98 fL Final    MCH 06/28/2023 31.4 (H)  27.0 - 31.0 pg Final    MCHC 06/28/2023 33.6  32.0 - 36.0 g/dL Final    RDW 06/28/2023 13.1  11.5 - 14.5 % Final    Platelets 06/28/2023 187  150 - 450 K/uL Final    MPV 06/28/2023 10.2  9.2 - 12.9 fL Final    Immature Granulocytes 06/28/2023 0.8 (H)  0.0 - 0.5 % Final    Gran # (ANC) 06/28/2023 3.2  1.8 - 7.7 K/uL Final    Immature Grans (Abs) 06/28/2023 0.04  0.00 - 0.04 K/uL Final    Lymph # 06/28/2023 1.1  1.0 - 4.8 K/uL Final    Mono # 06/28/2023 0.5  0.3 - 1.0 K/uL Final    Eos # 06/28/2023 0.1  0.0 - 0.5 K/uL Final    Baso # 06/28/2023 0.03  0.00 - 0.20 K/uL Final    nRBC 06/28/2023 0  0 /100 WBC Final    Gran % 06/28/2023 64.3  38.0 - 73.0 % Final    Lymph % 06/28/2023 22.1  18.0 - 48.0 % Final    Mono % 06/28/2023 11.0  4.0 - 15.0 % Final    Eosinophil % 06/28/2023 1.2  0.0 - 8.0 % Final    Basophil % 06/28/2023 0.6  0.0 - 1.9 % Final    Differential Method 06/28/2023 Automated   Final    Sodium 06/28/2023 139  136 - 145 mmol/L Final    Potassium 06/28/2023 4.2  3.5 - 5.1 mmol/L Final    Chloride 06/28/2023 108  95 - 110 mmol/L Final    CO2 06/28/2023 24  23 - 29 mmol/L Final    Glucose 06/28/2023 106  70 - 110 mg/dL Final    BUN 06/28/2023 16  8 - 23 mg/dL Final    Creatinine 06/28/2023 1.0  0.5 - 1.4 mg/dL Final    Calcium 06/28/2023 9.0  8.7 - 10.5 mg/dL Final    Anion Gap 06/28/2023 7 (L)  8 - 16 mmol/L Final    eGFR 06/28/2023 >60.0  >60 mL/min/1.73 m^2 Final    Urine Culture, Routine 06/28/2023 No growth   Final    Specimen UA 06/28/2023 Urine, Clean Catch   Final    Color, UA 06/28/2023 Yellow  Yellow, Straw, Hailey Final    Appearance, UA 06/28/2023 Clear  Clear Final    pH, UA 06/28/2023 6.0  5.0 - 8.0 Final    Specific Gravity, UA  06/28/2023 1.015  1.005 - 1.030 Final    Protein, UA 06/28/2023 Negative  Negative Final    Glucose, UA 06/28/2023 Negative  Negative Final    Ketones, UA 06/28/2023 Negative  Negative Final    Bilirubin (UA) 06/28/2023 Negative  Negative Final    Occult Blood UA 06/28/2023 1+ (A)  Negative Final    Nitrite, UA 06/28/2023 Negative  Negative Final    Urobilinogen, UA 06/28/2023 Negative  Negative EU/dL Final    Leukocytes, UA 06/28/2023 Negative  Negative Final    PSA Diagnostic 06/28/2023 6.3 (H)  0.00 - 4.00 ng/mL Final    RBC, UA 06/28/2023 1  0 - 4 /hpf Final    WBC, UA 06/28/2023 3  0 - 5 /hpf Final    Bacteria 06/28/2023 Negative  None-Occ /hpf Final    Squam Epithel, UA 06/28/2023 0  /hpf Final    Hyaline Casts, UA 06/28/2023 1  0-1/lpf /lpf Final    Microscopic Comment 06/28/2023 SEE COMMENT   Final   Admission on 05/17/2023, Discharged on 05/17/2023   Component Date Value Ref Range Status    Final Pathologic Diagnosis 05/17/2023    Final                    Value:1. Colon, rectosigmoid, polyps, polypectomies:  - Hyperplastic polyp fragment (x1)  - Polypoid fragments of colonic mucosa with no diagnostic histopathologic alterations  - Deeper levels examined      Gross 05/17/2023    Final                    Value:Container Label: Clinic Number/AP Number:  [id number] / [id number], and &quot;rectosigmoid polyps&quot;    Received in formalin is a 5 x 2 x 1 mm aggregate of soft tan polypoid tissue fragments.  Specimen is filtered and entirely submitted in QPV--1-ALOK Mccain, P.A.      Disclaimer 05/17/2023 Unless the case is a 'gross only' or additional testing only, the final diagnosis for each specimen is based on a microscopic examination of appropriate tissue sections.   Final       Past Medical History:   Diagnosis Date    Hypertension     Mixed hyperlipidemia     Prostate cancer     Rosacea      Social History     Socioeconomic History    Marital status:    Tobacco Use    Smoking status: Former      Current packs/day: 0.00     Types: Cigarettes     Quit date:      Years since quittin.3    Smokeless tobacco: Former     Types: Chew     Quit date:    Substance and Sexual Activity    Alcohol use: Yes     Comment: 3 drinks/day    Drug use: Never    Sexual activity: Yes     Past Surgical History:   Procedure Laterality Date    ABLATION, PROSTATE, HIGH INTENSITY FOCUSED ULTRASOUND (HIFU)  2023    patient stated due to being dx with prostate ca    COLONOSCOPY      COLONOSCOPY N/A 2023    Procedure: COLONOSCOPY;  Surgeon: Gerard Allen MD;  Location: [facility];  Service: Endoscopy;  Laterality: N/A;    PROSTATE BIOPSY N/A 2023    Procedure: BIOPSY, PROSTATE;  Surgeon: Thad Patricio MD;  [facility];  Service: Urology;  Laterality: N/A;    right hand surgery      VASECTOMY       No family history on file.    Review of patient's allergies indicates:  No Known Allergies    Current Outpatient Medications:     ALPRAZolam (XANAX) 0.5 MG tablet, Take 1 tablet (0.5 mg total) by mouth daily as needed for Anxiety., Disp: 30 tablet, Rfl: 0    latanoprost 0.005 % ophthalmic solution, Place 1 drop into both eyes every evening., Disp: , Rfl:     lisinopriL (PRINIVIL,ZESTRIL) 40 MG tablet, Take 1 tablet (40 mg total) by mouth once daily., Disp: 90 tablet, Rfl: 1    tadalafiL (CIALIS) 10 MG tablet, Take 1 tablet (10 mg total) by mouth once daily. Take 1 tablet by mouth every other day as needed for erectile dysfunction. (Patient taking differently: Take 10 mg by mouth once daily.), Disp: 30 tablet, Rfl: 5    triamcinolone acetonide 0.1% (KENALOG) 0.1 % cream, prn, Disp: , Rfl:     amLODIPine (NORVASC) 2.5 MG tablet, Take 1 tablet (2.5 mg total) by mouth once daily. (Patient not taking: Reported on 5/3/2024), Disp: 30 tablet, Rfl: 0    amoxicillin-clavulanate 875-125mg (AUGMENTIN) 875-125 mg per tablet, Take 1 tablet by mouth every 12 (twelve) hours., Disp: , Rfl:     azelastine  "(ASTELIN) 137 mcg (0.1 %) nasal spray, 1 spray (137 mcg total) by Nasal route 2 (two) times daily., Disp: 30 mL, Rfl: 0    doxycycline (VIBRAMYCIN) 100 MG Cap, Take 100 mg by mouth every 12 (twelve) hours., Disp: , Rfl:     levoFLOXacin (LEVAQUIN) 500 MG tablet, Take 1 tablet (500 mg total) by mouth once daily., Disp: 10 tablet, Rfl: 0    Review of Systems   Constitutional: Negative.    HENT: Negative.     Eyes: Negative.    Respiratory: Negative.     Cardiovascular: Negative.  Negative for chest pain and palpitations.   Gastrointestinal: Negative.    Endocrine: Negative.    Genitourinary: Negative.    Musculoskeletal: Negative.    Skin: Negative.    Neurological: Negative.    Hematological: Negative.    Psychiatric/Behavioral: Negative.     All other systems reviewed and are negative.         Objective:          12/13/2023     1:20 PM 12/20/2023    10:43 AM 1/10/2024    11:54 AM 3/19/2024     9:26 AM 4/5/2024    10:02 AM 5/3/2024     1:22 PM   Vitals - 1 value per visit   SYSTOLIC 147 165 118 123 140 148   DIASTOLIC 84 94 78 82 72 80   Pulse 80 80 74 66 81 76   Temp  98.7 °F (37.1 °C)  97.8 °F (36.6 °C) 99 °F (37.2 °C) 97.9 °F (36.6 °C)   Resp 18 16   17 18   SPO2  98 % 97 % 99 % 97 % 96 %   Weight (lb) 186.29 185.63 183.53 177.58 176.81 177.03   Weight (kg) 84.5 84.2 83.25 80.55 80.2 80.3   Height 5' 6" (1.676 m) 5' 6" (1.676 m) 5' 6" (1.676 m)  5' 6" (1.676 m) 5' 6" (1.676 m)   BMI (Calculated) 30.1 30 29.6  28.6 28.6   Pain Score  Zero Zero Zero Zero Zero       Physical Exam  Vitals reviewed.   Constitutional:       Appearance: Normal appearance. He is well-developed.   HENT:      Head: Normocephalic and atraumatic.      Right Ear: Tympanic membrane normal.      Left Ear: Tympanic membrane normal.      Nose: Nose normal.      Mouth/Throat:      Mouth: Mucous membranes are moist.      Pharynx: No oropharyngeal exudate.   Eyes:      Conjunctiva/sclera: Conjunctivae normal.      Pupils: Pupils are equal, round, and " reactive to light.   Neck:      Vascular: No carotid bruit.   Cardiovascular:      Rate and Rhythm: Normal rate and regular rhythm.      Pulses: Normal pulses.      Heart sounds: Normal heart sounds. No murmur heard.     No gallop.   Pulmonary:      Effort: Pulmonary effort is normal.      Breath sounds: Normal breath sounds.   Abdominal:      General: Bowel sounds are normal.      Palpations: Abdomen is soft. There is no mass.      Tenderness: There is no abdominal tenderness.   Musculoskeletal:         General: Normal range of motion.      Cervical back: Normal range of motion.   Skin:     General: Skin is warm and dry.   Neurological:      General: No focal deficit present.      Mental Status: He is alert and oriented to person, place, and time.   Psychiatric:         Mood and Affect: Mood normal.         Behavior: Behavior normal.           Assessment:       1. Hypertension, essential    2. CA of prostate         Plan:       Hypertension, essential    CA of prostate      No follow-ups on file.      Here for follow-up of hypertension but did not start the amlodipine.  Did however continue his lisinopril at 40 mg daily.  Follow-up in 3 weeks with my nurse practitioner.  Bring in his blood pressure cuff start the amlodipine 2.5 daily.  He did pick it up from the pharmacy.  Continue the lisinopril.

## 2024-05-24 RX ORDER — AMLODIPINE BESYLATE 2.5 MG/1
2.5 TABLET ORAL DAILY
Qty: 30 TABLET | Refills: 0 | Status: SHIPPED | OUTPATIENT
Start: 2024-05-24

## 2024-05-24 NOTE — TELEPHONE ENCOUNTER
No care due was identified.  [facility] Embedded Care Due Messages. Reference number: [id number].   5/24/2024 9:46:21 AM CDT

## 2024-06-11 ENCOUNTER — PATIENT OUTREACH (OUTPATIENT)
Dept: ADMINISTRATIVE | Facility: HOSPITAL | Age: 69
End: 2024-06-11
Payer: MEDICARE

## 2024-06-11 ENCOUNTER — PATIENT MESSAGE (OUTPATIENT)
Dept: ADMINISTRATIVE | Facility: HOSPITAL | Age: 69
End: 2024-06-11
Payer: MEDICARE

## 2024-06-11 NOTE — PROGRESS NOTES
Population Health Chart Review & Patient Outreach Details      Additional [facility] Health Notes:      Uncontrolled BP REPORT  BP Readings from Last 3 Encounters:   05/03/24 (!) 141/73   04/05/24 (!) 150/82   03/19/24 123/82       Non-compliant report chart audits for HYPERTENSION MANAGEMENT     Outreach to patient in reference to hypertension management       BLOOD PRESSURE FOLLOW UP NEEDED WITH A CARE TEAM MEMBER    ACTIVE PORTAL MESSAGE OR LETTER SENT                       Updates Requested / Reviewed:        Health Maintenance Topics Overdue:        VBHM Score: 1     Uncontrolled BP    RSV Vaccine                      Health Maintenance Topic(s) Outreach Outcomes & Actions Taken:    Blood Pressure - Outreach Outcomes & Actions Taken  : msg    Primary Care Appt - Outreach Outcomes & Actions Taken  : msg

## 2024-06-20 RX ORDER — AMLODIPINE BESYLATE 2.5 MG/1
2.5 TABLET ORAL
Qty: 90 TABLET | Refills: 3 | Status: SHIPPED | OUTPATIENT
Start: 2024-06-20

## 2024-06-20 NOTE — TELEPHONE ENCOUNTER
No care due was identified.  [facility] Embedded Care Due Messages. Reference number: [id number].   6/20/2024 9:19:42 AM CDT

## 2024-06-20 NOTE — TELEPHONE ENCOUNTER
Refill Routing Note   Medication(s) are not appropriate for processing by [facility] for the following reason(s):        New or recently adjusted medication  Required vitals abnormal    [facility] action(s):  Defer               Appointments  past 12m or future 3m with PCP    Date Provider   Last Visit   5/3/2024 Romeo Gould III, MD   Next Visit   Visit date not found Romeo Gould III, MD   ED visits in past 90 days: 0        Note composed:1:12 PM 06/20/2024

## 2024-06-21 RX ORDER — TADALAFIL 10 MG/1
10 TABLET ORAL DAILY PRN
Qty: 30 TABLET | Refills: 3 | Status: SHIPPED | OUTPATIENT
Start: 2024-06-21

## 2024-06-21 NOTE — TELEPHONE ENCOUNTER
Refill Decision Note   Phil Verduzco  is requesting a refill authorization.  Brief Assessment and Rationale for Refill:  Approve     Medication Therapy Plan:         Comments:     Note composed:11:40 AM 06/21/2024

## 2024-06-21 NOTE — TELEPHONE ENCOUNTER
No care due was identified.  [facility] Embedded Care Due Messages. Reference number: [id number].   6/21/2024 9:25:58 AM CDT

## 2024-07-01 ENCOUNTER — LAB VISIT (OUTPATIENT)
Dept: LAB | Facility: HOSPITAL | Age: 69
End: 2024-07-01
Attending: UROLOGY
Payer: MEDICARE

## 2024-07-01 DIAGNOSIS — C61 MALIGNANT NEOPLASM OF PROSTATE: Primary | ICD-10-CM

## 2024-07-01 DIAGNOSIS — C61 PROSTATE CANCER: ICD-10-CM

## 2024-07-01 LAB — COMPLEXED PSA SERPL-MCNC: 0.74 NG/ML (ref 0–4)

## 2024-07-01 PROCEDURE — 84153 ASSAY OF PSA TOTAL: CPT | Performed by: UROLOGY

## 2024-07-01 PROCEDURE — 36415 COLL VENOUS BLD VENIPUNCTURE: CPT | Performed by: UROLOGY

## 2024-07-05 ENCOUNTER — OFFICE VISIT (OUTPATIENT)
Dept: DERMATOLOGY | Facility: CLINIC | Age: 69
End: 2024-07-05
Payer: MEDICARE

## 2024-07-05 VITALS — BODY MASS INDEX: 27.64 KG/M2 | WEIGHT: 172 LBS | HEIGHT: 66 IN

## 2024-07-05 DIAGNOSIS — B07.8 COMMON WART: ICD-10-CM

## 2024-07-05 DIAGNOSIS — L20.84 INTRINSIC ECZEMA: Primary | ICD-10-CM

## 2024-07-05 DIAGNOSIS — Z85.828 ENCOUNTER FOR FOLLOW-UP SURVEILLANCE OF SKIN CANCER: ICD-10-CM

## 2024-07-05 DIAGNOSIS — L57.0 ACTINIC KERATOSES: ICD-10-CM

## 2024-07-05 DIAGNOSIS — D18.01 CHERRY ANGIOMA: ICD-10-CM

## 2024-07-05 DIAGNOSIS — L81.4 SOLAR LENTIGO: ICD-10-CM

## 2024-07-05 DIAGNOSIS — L82.1 SEBORRHEIC KERATOSES: ICD-10-CM

## 2024-07-05 DIAGNOSIS — Z08 ENCOUNTER FOR FOLLOW-UP SURVEILLANCE OF SKIN CANCER: ICD-10-CM

## 2024-07-05 RX ORDER — TRIAMCINOLONE ACETONIDE 1 MG/G
CREAM TOPICAL 2 TIMES DAILY
Qty: 80 G | Refills: 2 | Status: SHIPPED | OUTPATIENT
Start: 2024-07-05

## 2024-07-05 NOTE — PATIENT INSTRUCTIONS

## 2024-07-05 NOTE — PROGRESS NOTES
Subjective:      Patient ID:  Phil Verduzco is a 69 y.o. male who presents for   Chief Complaint   Patient presents with    Skin Check     TBSE    Spot     neck     LOV: 8/14/23 - NUB, AK, seb hyper, lentigo angioma, nevi    Skin, right paramedian upper forehead, shave biopsy:  - DESMOPLASTIC ADNEXAL TUMOR, FAVOR DESMOPLASTIC TRICHOEPITHELIOMA, INCOMPLETELY EXCISED (SEE COMMENT).    Skin Check - TBSE    C/o spot on neck  Denies symptoms    H/o prostate CA, recent surgery     Derm Hx:  + desmoplastic adnexal tumor, right paramedian forehead - mohs Dr. K 10/2023  No Phx NMSC  No Fhx MM     Patient has 3 laser treatments Jan to March 2021 (treated in [place])  for Rosacea and varicose veins on face.  Uses rosatrol nightly    Current Outpatient Medications:   ·  amLODIPine (NORVASC) 2.5 MG tablet, Take 1 tablet by mouth once daily, Disp: 90 tablet, Rfl: 3  ·  latanoprost 0.005 % ophthalmic solution, Place 1 drop into both eyes every evening., Disp: , Rfl:   ·  lisinopriL (PRINIVIL,ZESTRIL) 40 MG tablet, Take 1 tablet (40 mg total) by mouth once daily., Disp: 90 tablet, Rfl: 1  ·  tadalafiL (CIALIS) 10 MG tablet, TAKE 1 TABLET BY MOUTH ONCE DAILY AS NEEDED FOR ERECTILE DYSFUNCTION, Disp: 30 tablet, Rfl: 3  ·  triamcinolone acetonide 0.1% (KENALOG) 0.1 % cream, prn, Disp: , Rfl:   ·  ALPRAZolam (XANAX) 0.5 MG tablet, Take 1 tablet (0.5 mg total) by mouth daily as needed for Anxiety. (Patient not taking: Reported on 7/5/2024), Disp: 30 tablet, Rfl: 0               Review of Systems   Constitutional:  Negative for fever, chills and fatigue.   Respiratory:  Negative for cough and shortness of breath.    Skin:  Positive for activity-related sunscreen use and wears hat. Negative for itching, rash and dry skin.       Objective:   Physical Exam   Constitutional: He appears well-developed and well-nourished. No distress.   Neurological: He is alert and oriented to person, place, and time. He is not disoriented.   Psychiatric:  He has a normal mood and affect.   Skin:   Areas Examined (abnormalities noted in diagram):   Scalp / Hair Palpated and Inspected  Head / Face Inspection Performed  Neck Inspection Performed  Chest / Axilla Inspection Performed  Abdomen Inspection Performed  Genitals / Buttocks / Groin Inspection Performed  Back Inspection Performed  RUE Inspected  LUE Inspection Performed  RLE Inspected  LLE Inspection Performed  Nails and Digits Inspection Performed                     Diagram Legend     Erythematous scaling macule/papule c/w actinic keratosis       Vascular papule c/w angioma      Pigmented verrucoid papule/plaque c/w seborrheic keratosis      Yellow umbilicated papule c/w sebaceous hyperplasia      Irregularly shaped tan macule c/w lentigo     1-2 mm smooth white papules consistent with Milia      Movable subcutaneous cyst with punctum c/w epidermal inclusion cyst      Subcutaneous movable cyst c/w pilar cyst      Firm pink to brown papule c/w dermatofibroma      Pedunculated fleshy papule(s) c/w skin tag(s)      Evenly pigmented macule c/w junctional nevus     Mildly variegated pigmented, slightly irregular-bordered macule c/w mildly atypical nevus      Flesh colored to evenly pigmented papule c/w intradermal nevus       Pink pearly papule/plaque c/w basal cell carcinoma      Erythematous hyperkeratotic cursted plaque c/w SCC      Surgical scar with no sign of skin cancer recurrence      Open and closed comedones      Inflammatory papules and pustules      Verrucoid papule consistent consistent with wart     Erythematous eczematous patches and plaques     Dystrophic onycholytic nail with subungual debris c/w onychomycosis     Umbilicated papule    Erythematous-base heme-crusted tan verrucoid plaque consistent with inflamed seborrheic keratosis     Erythematous Silvery Scaling Plaque c/w Psoriasis     See annotation      Assessment / Plan:        Intrinsic eczema  -     triamcinolone acetonide 0.1% (KENALOG) 0.1  % cream; Apply topically 2 (two) times daily. prn  Dispense: 80 g; Refill: 2  Mild, left shin, uses tac PRN, refill    Actinic keratoses  Cryosurgery Procedure Note    Verbal consent from the patient is obtained and the patient is aware of the precancerous quality and need for treatment of these lesions. Liquid nitrogen cryosurgery is applied to the 2 actinic keratoses, as detailed in the physical exam, to produce a freeze injury. The patient is aware that blisters may form and is instructed on wound care with gentle cleansing and use of vaseline ointment to keep moist until healed. The patient is supplied a handout on cryosurgery and is instructed to call if lesions do not completely resolve.    Common wart  Cryosurgery procedure note:    Verbal consent from the patient is obtained. Liquid nitrogen cryosurgery is applied to 2 verruca without prior paring, as detailed in the physical exam, to produce a freeze injury. 3 consecutive freeze thaw cycles are applied to each verruca. The patient is aware that blisters (possibly blood blisters) may form.    Encounter for follow-up surveillance of skin cancer  Area of previous desmoplastic trichoep examined. Site well healed with no signs of recurrence.    Total body skin examination performed today including at least 12 points as noted in physical examination. No lesions suspicious for malignancy noted.    Cherry angioma  This is a benign vascular lesion. Reassurance given. No treatment required.     Solar lentigo  This is a benign hyperpigmented sun induced lesion. Daily sun protection will reduce the number of new lesions. Treatment of these benign lesions are considered cosmetic.    Seborrheic keratoses  These are benign inherited growths without a malignant potential. Reassurance given to patient. No treatment is necessary.     Patient instructed in importance in daily broad spectrum sun protection of at least spf 30. Mineral sunscreen ingredients preferred (Zinc +/-  Titanium) and can be found OTC.   Recommend Elta MD for daily use on face and neck.  Patient encouraged to wear hat for all outdoor exposure.   Also discussed sun avoidance and use of protective clothing.           Follow up in about 1 year (around 7/5/2025), or if symptoms worsen or fail to improve.

## 2024-07-09 ENCOUNTER — PATIENT MESSAGE (OUTPATIENT)
Dept: DERMATOLOGY | Facility: CLINIC | Age: 69
End: 2024-07-09
Payer: MEDICARE

## 2024-07-15 ENCOUNTER — PATIENT MESSAGE (OUTPATIENT)
Dept: DERMATOLOGY | Facility: CLINIC | Age: 69
End: 2024-07-15
Payer: MEDICARE

## 2024-07-31 RX ORDER — LISINOPRIL 40 MG/1
40 TABLET ORAL
Qty: 90 TABLET | Refills: 0 | Status: SHIPPED | OUTPATIENT
Start: 2024-07-31

## 2024-07-31 NOTE — TELEPHONE ENCOUNTER
No care due was identified.  [facility] Embedded Care Due Messages. Reference number: [id number].   7/31/2024 4:01:14 PM CDT

## 2024-07-31 NOTE — TELEPHONE ENCOUNTER
Refill Routing Note   Medication(s) are not appropriate for processing by [facility] for the following reason(s):        Required vitals abnormal    [facility] action(s):  Defer               Appointments  past 12m or future 3m with PCP    Date Provider   Last Visit   5/3/2024 Romeo Gould III, MD   Next Visit   Visit date not found Romeo Gould III, MD   ED visits in past 90 days: 0        Note composed:6:42 PM 07/31/2024

## 2024-08-01 ENCOUNTER — TELEPHONE (OUTPATIENT)
Dept: FAMILY MEDICINE | Facility: CLINIC | Age: 69
End: 2024-08-01
Payer: MEDICARE

## 2024-08-02 ENCOUNTER — PATIENT MESSAGE (OUTPATIENT)
Dept: FAMILY MEDICINE | Facility: CLINIC | Age: 69
End: 2024-08-02
Payer: MEDICARE

## 2024-09-06 ENCOUNTER — OFFICE VISIT (OUTPATIENT)
Dept: FAMILY MEDICINE | Facility: CLINIC | Age: 69
End: 2024-09-06
Payer: MEDICARE

## 2024-09-06 VITALS
OXYGEN SATURATION: 99 % | SYSTOLIC BLOOD PRESSURE: 124 MMHG | HEART RATE: 72 BPM | HEIGHT: 66 IN | WEIGHT: 181.69 LBS | BODY MASS INDEX: 29.2 KG/M2 | TEMPERATURE: 98 F | DIASTOLIC BLOOD PRESSURE: 66 MMHG

## 2024-09-06 DIAGNOSIS — I70.0 AORTIC ATHEROSCLEROSIS: ICD-10-CM

## 2024-09-06 DIAGNOSIS — I10 HYPERTENSION, ESSENTIAL: ICD-10-CM

## 2024-09-06 DIAGNOSIS — Z00.00 ENCOUNTER FOR PREVENTIVE HEALTH EXAMINATION: Primary | ICD-10-CM

## 2024-09-06 PROCEDURE — 99213 OFFICE O/P EST LOW 20 MIN: CPT | Mod: PBBFAC,PO | Performed by: NURSE PRACTITIONER

## 2024-09-06 PROCEDURE — 99999 PR PBB SHADOW E&M-EST. PATIENT-LVL III: CPT | Mod: PBBFAC,,, | Performed by: NURSE PRACTITIONER

## 2024-09-06 NOTE — PATIENT INSTRUCTIONS
Counseling and Referral of Other Preventative  (Italic type indicates deductible and co-insurance are waived)    Patient Name: Phil Verduzco  Today's Date: 9/6/2024    Health Maintenance       Date Due Completion Date    High Dose Statin Never done ---    RSV Vaccine (Age 60+ and Pregnant patients) (1 - 1-dose 60+ series) Never done ---    Influenza Vaccine (1) 09/01/2024 1/5/2023    Shingles Vaccine (1 of 2) 01/10/2025 (Originally 6/11/1974) ---    COVID-19 Vaccine (3 - Moderna risk series) 01/10/2025 (Originally 5/1/2021) 4/3/2021    PROSTATE-SPECIFIC ANTIGEN 07/01/2025 7/1/2024    Hemoglobin A1c (Diabetic Prevention Screening) 01/17/2027 1/17/2024    Colorectal Cancer Screening 05/17/2028 5/17/2023    Lipid Panel 01/17/2029 1/17/2024    TETANUS VACCINE 09/21/2032 9/21/2022        No orders of the defined types were placed in this encounter.      The following information is provided to all patients.  This information is to help you find resources for any of the problems found today that may be affecting your health:                  Living healthy guide: www.[facility].[place].gov      Understanding Diabetes: www.diabetes.org      Eating healthy: www.cdc.gov/healthyweight      CDC home safety checklist: www.cdc.gov/steadi/patient.html      Agency on Aging: www.goea.[place].[facility]      Alcoholics anonymous (AA): www.aa.org      Physical Activity: www.cathryn.nih.gov/cq3akqi      Tobacco use: www.quitwithusla.org

## 2024-09-06 NOTE — PROGRESS NOTES
"  Phil Verduzco presented for a  Medicare AWV and comprehensive Health Risk Assessment today. The following components were reviewed and updated:    Medical history  Family History  Social history  Allergies and Current Medications  Health Risk Assessment  Health Maintenance  Care Team         ** See Completed Assessments for Annual Wellness Visit within the encounter summary.**         The following assessments were completed:  Living Situation  CAGE  Depression Screening  Timed Get Up and Go  Whisper Test  Cognitive Function Screening  Nutrition Screening  ADL Screening  PAQ Screening    Clock in media   Opioid documentation:      Patient does not have a current opioid prescription.        Vitals:    09/06/24 1044   BP: 124/66   Pulse: 72   Temp: 98.2 °F (36.8 °C)   TempSrc: Oral   SpO2: 99%   Weight: 82.4 kg (181 lb 10.5 oz)   Height: 5' 6" (1.676 m)     Body mass index is 29.32 kg/m².  Physical Exam  Constitutional:       Appearance: He is well-developed.   HENT:      Head: Normocephalic and atraumatic.      Right Ear: Hearing normal.      Left Ear: Hearing normal.      Nose: Nose normal.   Eyes:      General: Lids are normal.      Conjunctiva/sclera: Conjunctivae normal.      Pupils: Pupils are equal, round, and reactive to light.   Cardiovascular:      Rate and Rhythm: Normal rate.   Pulmonary:      Effort: Pulmonary effort is normal.   Abdominal:      Palpations: Abdomen is soft.   Musculoskeletal:         General: Normal range of motion.      Cervical back: Normal range of motion and neck supple.   Skin:     General: Skin is warm and dry.   Neurological:      Mental Status: He is alert and oriented to person, place, and time.               Diagnoses and health risks identified today and associated recommendations/orders:    1. Encounter for preventive health examination  Discussed health maintenance guidelines appropriate for age.        2. Aortic atherosclerosis  Stable, continue to monitor  Followed by " pcp     3. Hypertension, essential  Controlled, continue current medication regimen  Low salt diet  Increase physical activity  Followed by pcp        Provided Phil with a 5-10 year written screening schedule and personal prevention plan. Recommendations were developed using the USPSTF age appropriate recommendations. Education, counseling, and referrals were provided as needed. After Visit Summary printed and given to patient which includes a list of additional screenings\tests needed.    Follow up for One year for Annual Wellness Visit.    Mirna Parks, NP      I offered to discuss advanced care planning, including how to pick a person who would make decisions for you if you were unable to make them for yourself, called a health care power of , and what kind of decisions you might make such as use of life sustaining treatments such as ventilators and tube feeding when faced with a life limiting illness recorded on a living will that they will need to know. (How you want to be cared for as you near the end of your natural life)     X  Patient has advanced directives on file, which we reviewed, and they do not wish to make changes.

## 2024-10-22 ENCOUNTER — OFFICE VISIT (OUTPATIENT)
Dept: FAMILY MEDICINE | Facility: CLINIC | Age: 69
End: 2024-10-22
Payer: MEDICARE

## 2024-10-22 ENCOUNTER — HOSPITAL ENCOUNTER (OUTPATIENT)
Dept: RADIOLOGY | Facility: HOSPITAL | Age: 69
Discharge: HOME | End: 2024-10-22
Attending: FAMILY MEDICINE
Payer: MEDICARE

## 2024-10-22 ENCOUNTER — TELEPHONE (OUTPATIENT)
Dept: FAMILY MEDICINE | Facility: CLINIC | Age: 69
End: 2024-10-22
Payer: MEDICARE

## 2024-10-22 VITALS
HEIGHT: 66 IN | SYSTOLIC BLOOD PRESSURE: 126 MMHG | OXYGEN SATURATION: 97 % | WEIGHT: 181.31 LBS | DIASTOLIC BLOOD PRESSURE: 64 MMHG | HEART RATE: 70 BPM | BODY MASS INDEX: 29.14 KG/M2 | TEMPERATURE: 98 F

## 2024-10-22 DIAGNOSIS — E78.5 HYPERLIPIDEMIA, UNSPECIFIED HYPERLIPIDEMIA TYPE: ICD-10-CM

## 2024-10-22 DIAGNOSIS — M79.652 LEFT THIGH PAIN: ICD-10-CM

## 2024-10-22 DIAGNOSIS — C61 CA OF PROSTATE: ICD-10-CM

## 2024-10-22 DIAGNOSIS — M17.12 OSTEOARTHRITIS OF LEFT KNEE, UNSPECIFIED OSTEOARTHRITIS TYPE: ICD-10-CM

## 2024-10-22 DIAGNOSIS — Z87.891 FORMER SMOKER: ICD-10-CM

## 2024-10-22 DIAGNOSIS — I10 HYPERTENSION, ESSENTIAL: Primary | ICD-10-CM

## 2024-10-22 DIAGNOSIS — N52.9 ERECTILE DYSFUNCTION, UNSPECIFIED ERECTILE DYSFUNCTION TYPE: ICD-10-CM

## 2024-10-22 PROCEDURE — 99213 OFFICE O/P EST LOW 20 MIN: CPT | Mod: PBBFAC,PN | Performed by: FAMILY MEDICINE

## 2024-10-22 PROCEDURE — 73564 X-RAY EXAM KNEE 4 OR MORE: CPT | Mod: 26,LT,, | Performed by: RADIOLOGY

## 2024-10-22 PROCEDURE — G2211 COMPLEX E/M VISIT ADD ON: HCPCS | Mod: S$PBB,,, | Performed by: FAMILY MEDICINE

## 2024-10-22 PROCEDURE — 73564 X-RAY EXAM KNEE 4 OR MORE: CPT | Mod: TC,LT

## 2024-10-22 PROCEDURE — 99214 OFFICE O/P EST MOD 30 MIN: CPT | Mod: S$PBB,,, | Performed by: FAMILY MEDICINE

## 2024-10-22 PROCEDURE — 99999 PR PBB SHADOW E&M-EST. PATIENT-LVL III: CPT | Mod: PBBFAC,,, | Performed by: FAMILY MEDICINE

## 2024-10-22 RX ORDER — LISINOPRIL 40 MG/1
40 TABLET ORAL DAILY
Qty: 90 TABLET | Refills: 1 | Status: SHIPPED | OUTPATIENT
Start: 2024-10-22

## 2024-10-22 RX ORDER — TADALAFIL 10 MG/1
10 TABLET ORAL DAILY PRN
Qty: 30 TABLET | Refills: 3 | Status: SHIPPED | OUTPATIENT
Start: 2024-10-22

## 2024-10-22 NOTE — PROGRESS NOTES
SCRIBE #1 NOTE: I, Ed Fitzpatrick, am scribing for, and in the presence of, Romeo Gould III, MD. I have scribed the entire note.     Subjective:       Patient ID: Phil Verduzco is a 69 y.o. male.    Chief Complaint: Leg Pain (Pain above the knee)    Phil Verduzco is a 69 y.o. male who presents for pain above his left knee. Mr. Verduzco was last seen 5/3/24. Leaving the country in a few weeks. Former smoker, quit in 1994.  He is currently complaining of an left knee arthralgia that primarily occurs when dangling his feet. To the patient it feels like a tendon stretch. He denies any recent falls. Hypertension is controlled. BP is 126/64. Needs refill on Lisinopril 40 mg. Cardiovascular good. No chest pain. No palpitations. HLD. BMI is 29.27. Prostate cancer. No changes. PSA is due in January. Last PSA was 0.7 (.82). 1 year ago it was 6.3. Erectile dysfunction. Requests refill on Cialis. Will receive covid vaccine soon. Patient declines Influenza vaccine until he receives the Covid first. RSV vaccine due. All other care gaps discussed.           Review of Systems   Constitutional:  Negative for chills and fever.   HENT:  Negative for congestion and sore throat.    Eyes:  Negative for pain and visual disturbance.   Respiratory:  Negative for chest tightness and shortness of breath.    Cardiovascular:  Negative for chest pain and palpitations.   Gastrointestinal:  Negative for nausea.   Endocrine: Negative for polydipsia and polyuria.   Genitourinary:  Negative for dysuria and flank pain.   Musculoskeletal:  Positive for arthralgias. Negative for back pain, neck pain and neck stiffness.   Skin:  Negative for rash.   Allergic/Immunologic: Negative for immunocompromised state.   Neurological:  Negative for dizziness and weakness.   Hematological:  Does not bruise/bleed easily.   Psychiatric/Behavioral:  Negative for agitation and behavioral problems.    All other systems reviewed and are negative.      Objective:       Physical examination: Vital signs noted. No acute distress. No carotid bruit. Regular heart rate and rhythm. Lungs clear to auscultation bilaterally. Abdomen bowel sounds are positive soft and nontender. Extremities without edema. 2+ pedal pulses. Tender distal femur proximal on patella.  No tenderness to palpation when knee is extended.       Assessment:       1. Hypertension, essential    2. Hyperlipidemia, unspecified hyperlipidemia type    3. CA of prostate    4. Erectile dysfunction, unspecified erectile dysfunction type    5. Former smoker    6. BMI 29.0-29.9,adult    7. Left thigh pain    8. Osteoarthritis of left knee, unspecified osteoarthritis type        Plan:       Hypertension, essential    Hyperlipidemia, unspecified hyperlipidemia type    CA of prostate    Erectile dysfunction, unspecified erectile dysfunction type    Former smoker    BMI 29.0-29.9,adult    Left thigh pain    Osteoarthritis of left knee, unspecified osteoarthritis type  -     X-Ray Knee Complete 4 or More Views Left; Future; Expected date: 10/22/2024    Refill lisinopril.  Refill the Cialis 10 mg.  Thirty with 2 refills.  PSA every 3 months.  Follow-up with his urologist in [place].  Local urologist will not follow him.  X-ray his left knee.  If the knee is still bothering him see orthopedics.  Follow-up in a month.  Recommend he do leg lifts to thickened the thigh muscle.  As the pain seems to be related to the kneecap riding over the end of the distal femur.    I, Dr Romeo Gould, personally performed the services described in this documentation. All medical record entries made by the scribe were at my direction and in my presence. I have reviewed the chart and agree that the record reflects my personal performance and is accurate and complete.

## 2024-10-23 ENCOUNTER — PATIENT MESSAGE (OUTPATIENT)
Dept: FAMILY MEDICINE | Facility: CLINIC | Age: 69
End: 2024-10-23
Payer: MEDICARE

## 2024-10-24 ENCOUNTER — TELEPHONE (OUTPATIENT)
Dept: FAMILY MEDICINE | Facility: CLINIC | Age: 69
End: 2024-10-24
Payer: MEDICARE

## 2024-10-24 DIAGNOSIS — M25.562 LEFT KNEE PAIN, UNSPECIFIED CHRONICITY: Primary | ICD-10-CM

## 2024-10-24 NOTE — TELEPHONE ENCOUNTER
Spoke with patient re xray of knee , the spurring can happen as we get older wear and tear . If cont to have pain we will put ref to orthopedist sohe can fu with them. Ref being placed and they will call to schedule once approved.

## 2024-12-02 ENCOUNTER — PATIENT MESSAGE (OUTPATIENT)
Dept: DERMATOLOGY | Facility: CLINIC | Age: 69
End: 2024-12-02
Payer: MEDICARE

## 2025-01-07 ENCOUNTER — PATIENT MESSAGE (OUTPATIENT)
Dept: FAMILY MEDICINE | Facility: CLINIC | Age: 70
End: 2025-01-07
Payer: MEDICARE

## 2025-01-07 DIAGNOSIS — C61 CA OF PROSTATE: Primary | ICD-10-CM

## 2025-01-08 ENCOUNTER — LAB VISIT (OUTPATIENT)
Dept: LAB | Facility: HOSPITAL | Age: 70
End: 2025-01-08
Attending: FAMILY MEDICINE
Payer: MEDICARE

## 2025-01-08 DIAGNOSIS — C61 CA OF PROSTATE: ICD-10-CM

## 2025-01-08 LAB — COMPLEXED PSA SERPL-MCNC: 1.28 NG/ML (ref 0–4)

## 2025-01-08 PROCEDURE — 36415 COLL VENOUS BLD VENIPUNCTURE: CPT | Performed by: FAMILY MEDICINE

## 2025-01-08 PROCEDURE — 84153 ASSAY OF PSA TOTAL: CPT | Performed by: FAMILY MEDICINE

## 2025-01-09 ENCOUNTER — PATIENT MESSAGE (OUTPATIENT)
Dept: FAMILY MEDICINE | Facility: CLINIC | Age: 70
End: 2025-01-09
Payer: MEDICARE

## 2025-01-09 DIAGNOSIS — C61 CA OF PROSTATE: Primary | ICD-10-CM

## 2025-01-23 DIAGNOSIS — I10 HYPERTENSION, ESSENTIAL: ICD-10-CM

## 2025-01-28 ENCOUNTER — OFFICE VISIT (OUTPATIENT)
Dept: DERMATOLOGY | Facility: CLINIC | Age: 70
End: 2025-01-28
Payer: MEDICARE

## 2025-01-28 ENCOUNTER — PATIENT MESSAGE (OUTPATIENT)
Dept: DERMATOLOGY | Facility: CLINIC | Age: 70
End: 2025-01-28

## 2025-01-28 VITALS — HEIGHT: 66 IN | BODY MASS INDEX: 29.12 KG/M2 | WEIGHT: 181.19 LBS

## 2025-01-28 DIAGNOSIS — Z12.83 SKIN CANCER SCREENING: Primary | ICD-10-CM

## 2025-01-28 DIAGNOSIS — D23.30 DESMOPLASTIC TRICHOEPITHELIOMA: ICD-10-CM

## 2025-01-28 DIAGNOSIS — L82.1 SEBORRHEIC KERATOSES: ICD-10-CM

## 2025-01-28 DIAGNOSIS — L81.4 SOLAR LENTIGO: ICD-10-CM

## 2025-01-28 DIAGNOSIS — D18.01 CHERRY ANGIOMA: ICD-10-CM

## 2025-01-28 PROCEDURE — 99213 OFFICE O/P EST LOW 20 MIN: CPT | Mod: S$GLB,,, | Performed by: DERMATOLOGY

## 2025-01-28 NOTE — PROGRESS NOTES
Subjective:      Patient ID:  Phil Verduzco is a 69 y.o. male who presents for   Chief Complaint   Patient presents with    Spot     nose     LOV: 7/5/24 - eczema, SK, angioma, wart    Skin Check- UBSE    C/o spot on nose  Few weeks, started as a pimple    C/o dry skin on back    Derm Hx:  + desmoplastic adnexal tumor, right paramedian forehead - mohs Dr. K 10/2023  No Phx NMSC  No Fhx MM      Patient has 3 laser treatments Jan to March 2021 (treated in [place])  for Rosacea and varicose veins on face.  Uses rosatrol nightly    Current Outpatient Medications:   ·  ALPRAZolam (XANAX) 0.5 MG tablet, Take 1 tablet (0.5 mg total) by mouth daily as needed for Anxiety., Disp: 30 tablet, Rfl: 0  ·  amLODIPine (NORVASC) 2.5 MG tablet, Take 1 tablet by mouth once daily, Disp: 90 tablet, Rfl: 3  ·  latanoprost 0.005 % ophthalmic solution, Place 1 drop into both eyes every evening., Disp: , Rfl:   ·  lisinopriL (PRINIVIL,ZESTRIL) 40 MG tablet, Take 1 tablet (40 mg total) by mouth once daily., Disp: 90 tablet, Rfl: 1  ·  tadalafiL (CIALIS) 10 MG tablet, Take 1 tablet (10 mg total) by mouth daily as needed for Erectile Dysfunction., Disp: 30 tablet, Rfl: 3  ·  triamcinolone acetonide 0.1% (KENALOG) 0.1 % cream, Apply topically 2 (two) times daily. prn, Disp: 80 g, Rfl: 2          Review of Systems   Constitutional:  Negative for fever, chills and fatigue.   Respiratory:  Negative for cough and shortness of breath.    Skin:  Positive for activity-related sunscreen use and wears hat. Negative for itching, rash and dry skin.       Objective:   Physical Exam   Constitutional: He appears well-developed and well-nourished. No distress.   Neurological: He is alert and oriented to person, place, and time. He is not disoriented.   Psychiatric: He has a normal mood and affect.   Skin:   Areas Examined (abnormalities noted in diagram):   Scalp / Hair Palpated and Inspected  Head / Face Inspection Performed  Neck Inspection  Performed  Chest / Axilla Inspection Performed  Back Inspection Performed  RUE Inspected  LUE Inspection Performed  Nails and Digits Inspection Performed                 Diagram Legend     Erythematous scaling macule/papule c/w actinic keratosis       Vascular papule c/w angioma      Pigmented verrucoid papule/plaque c/w seborrheic keratosis      Yellow umbilicated papule c/w sebaceous hyperplasia      Irregularly shaped tan macule c/w lentigo     1-2 mm smooth white papules consistent with Milia      Movable subcutaneous cyst with punctum c/w epidermal inclusion cyst      Subcutaneous movable cyst c/w pilar cyst      Firm pink to brown papule c/w dermatofibroma      Pedunculated fleshy papule(s) c/w skin tag(s)      Evenly pigmented macule c/w junctional nevus     Mildly variegated pigmented, slightly irregular-bordered macule c/w mildly atypical nevus      Flesh colored to evenly pigmented papule c/w intradermal nevus       Pink pearly papule/plaque c/w basal cell carcinoma      Erythematous hyperkeratotic cursted plaque c/w SCC      Surgical scar with no sign of skin cancer recurrence      Open and closed comedones      Inflammatory papules and pustules      Verrucoid papule consistent consistent with wart     Erythematous eczematous patches and plaques     Dystrophic onycholytic nail with subungual debris c/w onychomycosis     Umbilicated papule    Erythematous-base heme-crusted tan verrucoid plaque consistent with inflamed seborrheic keratosis     Erythematous Silvery Scaling Plaque c/w Psoriasis     See annotation      Assessment / Plan:        Skin cancer screening  Upper body skin examination performed today including at least 9 points as noted in physical examination. No lesions suspicious for malignancy noted.    Seborrheic keratoses  These are benign inherited growths without a malignant potential. Reassurance given to patient. No treatment is necessary.     Solar lentigo  This is a benign hyperpigmented  sun induced lesion. Daily sun protection will reduce the number of new lesions. Treatment of these benign lesions are considered cosmetic.    Desmoplastic trichoepithelioma  Central forehead, post Mohs, NER    Cherry angioma  This is a benign vascular lesion. Reassurance given. No treatment required.     Actinic skin damage  Patient instructed in importance in daily broad spectrum sun protection of at least spf 30. Mineral sunscreen ingredients preferred (Zinc +/- Titanium) and can be found OTC.   Patient encouraged to wear hat for all outdoor exposure.   Also discussed sun avoidance and use of protective clothing.    Xerosis cutis  Good skin care regimen discussed including limiting to one bath or shower/day, using lukewarm water with mild soap and moisturizing cream to skin 1 - 2x/day. Cerave cream preferred. Neutrogena water gel BID to face    Tac cream to upper back             No follow-ups on file.

## 2025-02-12 ENCOUNTER — LAB VISIT (OUTPATIENT)
Dept: LAB | Facility: HOSPITAL | Age: 70
End: 2025-02-12
Attending: FAMILY MEDICINE
Payer: MEDICARE

## 2025-02-12 DIAGNOSIS — I10 HYPERTENSION, ESSENTIAL: ICD-10-CM

## 2025-02-12 LAB
ALBUMIN SERPL BCP-MCNC: 4 G/DL (ref 3.5–5.2)
ALP SERPL-CCNC: 38 U/L (ref 55–135)
ALT SERPL W/O P-5'-P-CCNC: 25 U/L (ref 10–44)
ANION GAP SERPL CALC-SCNC: 6 MMOL/L (ref 8–16)
AST SERPL-CCNC: 17 U/L (ref 10–40)
BILIRUB SERPL-MCNC: 1 MG/DL (ref 0.1–1)
BUN SERPL-MCNC: 14 MG/DL (ref 8–23)
CALCIUM SERPL-MCNC: 8.8 MG/DL (ref 8.7–10.5)
CHLORIDE SERPL-SCNC: 107 MMOL/L (ref 95–110)
CO2 SERPL-SCNC: 28 MMOL/L (ref 23–29)
CREAT SERPL-MCNC: 0.9 MG/DL (ref 0.5–1.4)
EST. GFR  (NO RACE VARIABLE): >60 ML/MIN/1.73 M^2
GLUCOSE SERPL-MCNC: 101 MG/DL (ref 70–110)
POTASSIUM SERPL-SCNC: 4.7 MMOL/L (ref 3.5–5.1)
PROT SERPL-MCNC: 6.7 G/DL (ref 6–8.4)
SODIUM SERPL-SCNC: 141 MMOL/L (ref 136–145)

## 2025-02-12 PROCEDURE — 36415 COLL VENOUS BLD VENIPUNCTURE: CPT | Performed by: FAMILY MEDICINE

## 2025-02-12 PROCEDURE — 80053 COMPREHEN METABOLIC PANEL: CPT | Performed by: FAMILY MEDICINE

## 2025-02-13 ENCOUNTER — PATIENT MESSAGE (OUTPATIENT)
Dept: FAMILY MEDICINE | Facility: CLINIC | Age: 70
End: 2025-02-13
Payer: MEDICARE

## 2025-02-14 DIAGNOSIS — I10 HYPERTENSION, ESSENTIAL: Primary | ICD-10-CM

## 2025-02-24 ENCOUNTER — LAB VISIT (OUTPATIENT)
Dept: LAB | Facility: HOSPITAL | Age: 70
End: 2025-02-24
Attending: FAMILY MEDICINE
Payer: MEDICARE

## 2025-02-24 DIAGNOSIS — I10 HYPERTENSION, ESSENTIAL: ICD-10-CM

## 2025-02-24 LAB
BASOPHILS # BLD AUTO: 0.03 K/UL (ref 0–0.2)
BASOPHILS NFR BLD: 0.6 % (ref 0–1.9)
CHOLEST SERPL-MCNC: 215 MG/DL (ref 120–199)
CHOLEST/HDLC SERPL: 4.1 {RATIO} (ref 2–5)
DIFFERENTIAL METHOD BLD: ABNORMAL
EOSINOPHIL # BLD AUTO: 0.1 K/UL (ref 0–0.5)
EOSINOPHIL NFR BLD: 2.8 % (ref 0–8)
ERYTHROCYTE [DISTWIDTH] IN BLOOD BY AUTOMATED COUNT: 12.2 % (ref 11.5–14.5)
HCT VFR BLD AUTO: 40.7 % (ref 40–54)
HDLC SERPL-MCNC: 52 MG/DL (ref 40–75)
HDLC SERPL: 24.2 % (ref 20–50)
HGB BLD-MCNC: 14.4 G/DL (ref 14–18)
IMM GRANULOCYTES # BLD AUTO: 0.02 K/UL (ref 0–0.04)
IMM GRANULOCYTES NFR BLD AUTO: 0.4 % (ref 0–0.5)
LDLC SERPL CALC-MCNC: 122.8 MG/DL (ref 63–159)
LYMPHOCYTES # BLD AUTO: 1.3 K/UL (ref 1–4.8)
LYMPHOCYTES NFR BLD: 25.3 % (ref 18–48)
MCH RBC QN AUTO: 31.7 PG (ref 27–31)
MCHC RBC AUTO-ENTMCNC: 35.4 G/DL (ref 32–36)
MCV RBC AUTO: 90 FL (ref 82–98)
MONOCYTES # BLD AUTO: 0.5 K/UL (ref 0.3–1)
MONOCYTES NFR BLD: 9.8 % (ref 4–15)
NEUTROPHILS # BLD AUTO: 3.1 K/UL (ref 1.8–7.7)
NEUTROPHILS NFR BLD: 61.1 % (ref 38–73)
NONHDLC SERPL-MCNC: 163 MG/DL
NRBC BLD-RTO: 0 /100 WBC
PLATELET # BLD AUTO: 197 K/UL (ref 150–450)
PMV BLD AUTO: 8.9 FL (ref 9.2–12.9)
RBC # BLD AUTO: 4.54 M/UL (ref 4.6–6.2)
TRIGL SERPL-MCNC: 201 MG/DL (ref 30–150)
WBC # BLD AUTO: 5.01 K/UL (ref 3.9–12.7)

## 2025-02-24 PROCEDURE — 85025 COMPLETE CBC W/AUTO DIFF WBC: CPT | Performed by: FAMILY MEDICINE

## 2025-02-24 PROCEDURE — 80061 LIPID PANEL: CPT | Performed by: FAMILY MEDICINE

## 2025-02-24 PROCEDURE — 36415 COLL VENOUS BLD VENIPUNCTURE: CPT | Performed by: FAMILY MEDICINE

## 2025-02-27 ENCOUNTER — OFFICE VISIT (OUTPATIENT)
Dept: FAMILY MEDICINE | Facility: CLINIC | Age: 70
End: 2025-02-27
Payer: MEDICARE

## 2025-02-27 VITALS
HEIGHT: 66 IN | SYSTOLIC BLOOD PRESSURE: 120 MMHG | BODY MASS INDEX: 28.93 KG/M2 | HEART RATE: 76 BPM | DIASTOLIC BLOOD PRESSURE: 62 MMHG | WEIGHT: 180 LBS | OXYGEN SATURATION: 98 %

## 2025-02-27 DIAGNOSIS — Z87.891 FORMER SMOKER: ICD-10-CM

## 2025-02-27 DIAGNOSIS — C61 CA OF PROSTATE: Primary | ICD-10-CM

## 2025-02-27 DIAGNOSIS — E53.8 VITAMIN B12 DEFICIENCY: ICD-10-CM

## 2025-02-27 DIAGNOSIS — I10 HYPERTENSION, ESSENTIAL: ICD-10-CM

## 2025-02-27 DIAGNOSIS — D64.9 ANEMIA, UNSPECIFIED TYPE: ICD-10-CM

## 2025-02-27 DIAGNOSIS — K63.5 POLYP OF COLON, UNSPECIFIED PART OF COLON, UNSPECIFIED TYPE: ICD-10-CM

## 2025-02-27 DIAGNOSIS — Z77.090 ASBESTOS EXPOSURE: ICD-10-CM

## 2025-02-27 DIAGNOSIS — H91.90 HEARING LOSS, UNSPECIFIED HEARING LOSS TYPE, UNSPECIFIED LATERALITY: ICD-10-CM

## 2025-02-27 PROBLEM — E78.5 HYPERLIPIDEMIA: Status: RESOLVED | Noted: 2023-01-05 | Resolved: 2025-02-27

## 2025-02-27 PROCEDURE — 99214 OFFICE O/P EST MOD 30 MIN: CPT | Mod: S$PBB,,, | Performed by: FAMILY MEDICINE

## 2025-02-27 PROCEDURE — 99214 OFFICE O/P EST MOD 30 MIN: CPT | Mod: PBBFAC,PN | Performed by: FAMILY MEDICINE

## 2025-02-27 PROCEDURE — 99999 PR PBB SHADOW E&M-EST. PATIENT-LVL IV: CPT | Mod: PBBFAC,,, | Performed by: FAMILY MEDICINE

## 2025-02-28 ENCOUNTER — HOSPITAL ENCOUNTER (OUTPATIENT)
Dept: RADIOLOGY | Facility: HOSPITAL | Age: 70
Discharge: HOME | End: 2025-02-28
Attending: FAMILY MEDICINE
Payer: MEDICARE

## 2025-02-28 ENCOUNTER — PATIENT MESSAGE (OUTPATIENT)
Dept: FAMILY MEDICINE | Facility: CLINIC | Age: 70
End: 2025-02-28
Payer: MEDICARE

## 2025-02-28 DIAGNOSIS — I10 HYPERTENSION, ESSENTIAL: ICD-10-CM

## 2025-02-28 PROCEDURE — 71046 X-RAY EXAM CHEST 2 VIEWS: CPT | Mod: 26,,, | Performed by: RADIOLOGY

## 2025-02-28 PROCEDURE — 71046 X-RAY EXAM CHEST 2 VIEWS: CPT | Mod: TC

## 2025-03-01 ENCOUNTER — RESULTS FOLLOW-UP (OUTPATIENT)
Dept: FAMILY MEDICINE | Facility: CLINIC | Age: 70
End: 2025-03-01

## 2025-03-01 RX ORDER — TADALAFIL 10 MG/1
10 TABLET ORAL DAILY PRN
Qty: 30 TABLET | Refills: 3 | Status: SHIPPED | OUTPATIENT
Start: 2025-03-01

## 2025-03-02 NOTE — TELEPHONE ENCOUNTER
No care due was identified.  [facility] Embedded Care Due Messages. Reference number: [id number].   3/01/2025 9:19:14 PM CST

## 2025-03-02 NOTE — TELEPHONE ENCOUNTER
Refill Decision Note   Phil Verduzco  is requesting a refill authorization.  Brief Assessment and Rationale for Refill:  Approve     Medication Therapy Plan:         Comments:     Note composed:11:48 PM 03/01/2025

## 2025-03-03 NOTE — PROGRESS NOTES
Subjective:       Patient ID: Phil Verduzco is a 69 y.o. male.    Chief Complaint: Annual Exam    Carcinoma of the prostate PSA has gone up to 1.28 then repeated again and went down to 1.  Seeing a physician in the area now instead of in [place].  Dr. Sellers.  Some urinary issues also.  Trouble emptying bladder.  Anemia hemoglobin 14.4.  Former smoker quit in 1994.  Does have some asbestos exposure.  CT was done in March of 2023 colonoscopy May of 2023 follow-up repeat will be need to be done in May of 2028.  Hypertension is controlled.  BMI of 29.  Does have some hearing loss issues.    Physical examination.  Vital signs noted.  Neck without bruit chest clear.  Heart regular rate rhythm.  Abdomen bowel sounds positive soft nontender.  Extremities without edema positive pulses.        Objective:        Assessment:       1. CA of prostate    2. Hypertension, essential    3. Hearing loss, unspecified hearing loss type, unspecified laterality    4. BMI 29.0-29.9,adult    5. Polyp of colon, unspecified part of colon, unspecified type    6. Anemia, unspecified type    7. Asbestos exposure    8. Vitamin B12 deficiency    9. Former smoker        Plan:       CA of prostate    Hypertension, essential  -     X-Ray Chest PA And Lateral; Future; Expected date: 02/27/2025    Hearing loss, unspecified hearing loss type, unspecified laterality  -     Ambulatory referral/consult to ENT; Future; Expected date: 03/06/2025  -     Ambulatory referral/consult to Audiology; Future; Expected date: 03/06/2025    BMI 29.0-29.9,adult    Polyp of colon, unspecified part of colon, unspecified type    Anemia, unspecified type  -     Ferritin; Future; Expected date: 02/27/2025  -     Iron and TIBC; Future; Expected date: 02/27/2025    Asbestos exposure  -     Complete PFT with bronchodilator; Future    Vitamin B12 deficiency  -     Folate; Future; Expected date: 02/27/2025  -     Vitamin B12; Future; Expected date: 02/27/2025    Former  smoker    Continue follow-up with urology regarding the prostate cancer.  Will check ferritin iron TIBC folate and B12 due to the anemia.  Referral to ENT.  Check chest x-ray.  Pulmonary function studies ordered

## 2025-03-05 ENCOUNTER — TELEPHONE (OUTPATIENT)
Dept: FAMILY MEDICINE | Facility: CLINIC | Age: 70
End: 2025-03-05
Payer: MEDICARE

## 2025-03-05 DIAGNOSIS — E53.8 VITAMIN B12 DEFICIENCY: Primary | ICD-10-CM

## 2025-03-12 ENCOUNTER — PATIENT MESSAGE (OUTPATIENT)
Dept: FAMILY MEDICINE | Facility: CLINIC | Age: 70
End: 2025-03-12
Payer: MEDICARE

## 2025-03-13 ENCOUNTER — TELEPHONE (OUTPATIENT)
Dept: FAMILY MEDICINE | Facility: CLINIC | Age: 70
End: 2025-03-13
Payer: MEDICARE

## 2025-05-02 ENCOUNTER — PATIENT MESSAGE (OUTPATIENT)
Dept: FAMILY MEDICINE | Facility: CLINIC | Age: 70
End: 2025-05-02
Payer: MEDICARE

## 2025-05-02 ENCOUNTER — TELEPHONE (OUTPATIENT)
Dept: FAMILY MEDICINE | Facility: CLINIC | Age: 70
End: 2025-05-02
Payer: MEDICARE

## 2025-05-02 RX ORDER — LISINOPRIL 40 MG/1
40 TABLET ORAL DAILY
Qty: 90 TABLET | Refills: 1 | Status: SHIPPED | OUTPATIENT
Start: 2025-05-02

## 2025-05-02 NOTE — TELEPHONE ENCOUNTER
No care due was identified.  [facility] Embedded Care Due Messages. Reference number: [id number].   5/02/2025 8:17:04 AM CDT

## 2025-05-08 ENCOUNTER — PATIENT MESSAGE (OUTPATIENT)
Dept: DERMATOLOGY | Facility: CLINIC | Age: 70
End: 2025-05-08
Payer: MEDICARE

## 2025-05-30 ENCOUNTER — OFFICE VISIT (OUTPATIENT)
Dept: DERMATOLOGY | Facility: CLINIC | Age: 70
End: 2025-05-30
Payer: MEDICARE

## 2025-05-30 VITALS — BODY MASS INDEX: 28.45 KG/M2 | HEIGHT: 66 IN | WEIGHT: 177 LBS

## 2025-05-30 DIAGNOSIS — L82.1 SEBORRHEIC KERATOSES: Primary | ICD-10-CM

## 2025-05-30 NOTE — PROGRESS NOTES
Subjective:      Patient ID:  Phil Verduzco is a 69 y.o. male who presents for   Chief Complaint   Patient presents with    Spot     On nose, left forearm     Lov- 1/28/25- sk, lentigo, angioma, skin check    Here today for a f/u on spot on left nose   C/o spot on left forearm with no symptoms    Derm Hx:  + desmoplastic adnexal tumor, right paramedian forehead - mohs Dr. K 10/2023  No Phx NMSC  No Fhx MM     Current Outpatient Medications:   ·  ALPRAZolam (XANAX) 0.5 MG tablet, Take 1 tablet (0.5 mg total) by mouth daily as needed for Anxiety., Disp: 30 tablet, Rfl: 0  ·  amLODIPine (NORVASC) 2.5 MG tablet, Take 1 tablet by mouth once daily, Disp: 90 tablet, Rfl: 3  ·  latanoprost 0.005 % ophthalmic solution, Place 1 drop into both eyes every evening., Disp: , Rfl:   ·  lisinopriL (PRINIVIL,ZESTRIL) 40 MG tablet, Take 1 tablet (40 mg total) by mouth once daily., Disp: 90 tablet, Rfl: 1  ·  tadalafiL (CIALIS) 10 MG tablet, TAKE 1 TABLET BY MOUTH ONCE DAILY AS NEEDED FOR ERECTILE DYSFUNCTION, Disp: 30 tablet, Rfl: 3  ·  triamcinolone acetonide 0.1% (KENALOG) 0.1 % cream, Apply topically 2 (two) times daily. prn, Disp: 80 g, Rfl: 2          Review of Systems   Constitutional:  Negative for fever, chills and fatigue.   Respiratory:  Negative for cough and shortness of breath.    Skin:  Positive for activity-related sunscreen use and wears hat. Negative for itching, rash and dry skin.       Objective:   Physical Exam   Constitutional: He appears well-developed and well-nourished. No distress.   Neurological: He is alert and oriented to person, place, and time. He is not disoriented.   Psychiatric: He has a normal mood and affect.   Skin:   Areas Examined (abnormalities noted in diagram):   Head / Face Inspection Performed  RUE Inspected  LUE Inspection Performed  Nails and Digits Inspection Performed                 Diagram Legend     Erythematous scaling macule/papule c/w actinic keratosis       Vascular papule c/w  angioma      Pigmented verrucoid papule/plaque c/w seborrheic keratosis      Yellow umbilicated papule c/w sebaceous hyperplasia      Irregularly shaped tan macule c/w lentigo     1-2 mm smooth white papules consistent with Milia      Movable subcutaneous cyst with punctum c/w epidermal inclusion cyst      Subcutaneous movable cyst c/w pilar cyst      Firm pink to brown papule c/w dermatofibroma      Pedunculated fleshy papule(s) c/w skin tag(s)      Evenly pigmented macule c/w junctional nevus     Mildly variegated pigmented, slightly irregular-bordered macule c/w mildly atypical nevus      Flesh colored to evenly pigmented papule c/w intradermal nevus       Pink pearly papule/plaque c/w basal cell carcinoma      Erythematous hyperkeratotic cursted plaque c/w SCC      Surgical scar with no sign of skin cancer recurrence      Open and closed comedones      Inflammatory papules and pustules      Verrucoid papule consistent consistent with wart     Erythematous eczematous patches and plaques     Dystrophic onycholytic nail with subungual debris c/w onychomycosis     Umbilicated papule    Erythematous-base heme-crusted tan verrucoid plaque consistent with inflamed seborrheic keratosis     Erythematous Silvery Scaling Plaque c/w Psoriasis     See annotation      Assessment / Plan:        Seborrheic keratoses    Left forearm, reassurance  These are benign inherited growths without a malignant potential. Reassurance given to patient. No treatment is necessary.            No follow-ups on file.

## 2025-06-03 RX ORDER — TADALAFIL 10 MG/1
10 TABLET ORAL DAILY PRN
Qty: 30 TABLET | Refills: 3 | Status: SHIPPED | OUTPATIENT
Start: 2025-06-03

## 2025-06-07 NOTE — TELEPHONE ENCOUNTER
No care due was identified.  [facility] Embedded Care Due Messages. Reference number: [id number].   6/07/2025 2:44:32 PM CDT

## 2025-06-08 RX ORDER — AMLODIPINE BESYLATE 2.5 MG/1
2.5 TABLET ORAL
Qty: 90 TABLET | Refills: 2 | Status: SHIPPED | OUTPATIENT
Start: 2025-06-08

## 2025-06-08 NOTE — TELEPHONE ENCOUNTER
Refill Decision Note   Phil Verduzco  is requesting a refill authorization.  Brief Assessment and Rationale for Refill:  Approve     Medication Therapy Plan:        Comments:     Note composed:1:53 PM 06/08/2025

## 2025-06-14 ENCOUNTER — PATIENT MESSAGE (OUTPATIENT)
Dept: FAMILY MEDICINE | Facility: CLINIC | Age: 70
End: 2025-06-14
Payer: MEDICARE

## 2025-06-18 ENCOUNTER — OFFICE VISIT (OUTPATIENT)
Dept: FAMILY MEDICINE | Facility: CLINIC | Age: 70
End: 2025-06-18
Payer: MEDICARE

## 2025-06-18 ENCOUNTER — RESULTS FOLLOW-UP (OUTPATIENT)
Dept: FAMILY MEDICINE | Facility: CLINIC | Age: 70
End: 2025-06-18

## 2025-06-18 ENCOUNTER — HOSPITAL ENCOUNTER (OUTPATIENT)
Dept: RADIOLOGY | Facility: HOSPITAL | Age: 70
Discharge: HOME | End: 2025-06-18
Attending: NURSE PRACTITIONER
Payer: MEDICARE

## 2025-06-18 VITALS
SYSTOLIC BLOOD PRESSURE: 138 MMHG | DIASTOLIC BLOOD PRESSURE: 58 MMHG | BODY MASS INDEX: 29.35 KG/M2 | OXYGEN SATURATION: 96 % | HEIGHT: 66 IN | TEMPERATURE: 99 F | WEIGHT: 182.63 LBS | HEART RATE: 85 BPM
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
## 2025-06-18 DIAGNOSIS — R50.9 FEVER, UNSPECIFIED FEVER CAUSE: ICD-10-CM
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
## 2025-06-18 DIAGNOSIS — T17.928A ASPIRATION OF FOOD, INITIAL ENCOUNTER: ICD-10-CM

## 2025-06-18 DIAGNOSIS — J84.10 GRANULOMATOUS LUNG DISEASE: ICD-10-CM

## 2025-06-18 DIAGNOSIS — R05.9 COUGH, UNSPECIFIED TYPE: ICD-10-CM

## 2025-06-18 DIAGNOSIS — I70.0 AORTIC ATHEROSCLEROSIS: ICD-10-CM

## 2025-06-18 DIAGNOSIS — T17.928A ASPIRATION OF FOOD, INITIAL ENCOUNTER: Primary | ICD-10-CM

## 2025-06-18 PROCEDURE — 71046 X-RAY EXAM CHEST 2 VIEWS: CPT | Mod: 26,,, | Performed by: RADIOLOGY

## 2025-06-18 PROCEDURE — 71046 X-RAY EXAM CHEST 2 VIEWS: CPT | Mod: TC

## 2025-06-18 PROCEDURE — 99214 OFFICE O/P EST MOD 30 MIN: CPT | Mod: S$PBB,,, | Performed by: NURSE PRACTITIONER

## 2025-06-18 PROCEDURE — 99999 PR PBB SHADOW E&M-EST. PATIENT-LVL III: CPT | Mod: PBBFAC,,, | Performed by: NURSE PRACTITIONER

## 2025-06-18 PROCEDURE — 99213 OFFICE O/P EST LOW 20 MIN: CPT | Mod: PBBFAC,PN | Performed by: NURSE PRACTITIONER

## 2025-06-18 RX ORDER — PREDNISONE 20 MG/1
20 TABLET ORAL DAILY
Qty: 5 TABLET | Refills: 0 | Status: SHIPPED | OUTPATIENT
Start: 2025-06-18

## 2025-06-18 RX ORDER — AMOXICILLIN AND CLAVULANATE POTASSIUM 875; 125 MG/1; MG/1
1 TABLET, FILM COATED ORAL 2 TIMES DAILY
Qty: 14 TABLET | Refills: 0 | Status: SHIPPED | OUTPATIENT
Start: 2025-06-18

## 2025-06-18 NOTE — PROGRESS NOTES
Subjective:       Patient ID: Phil Verduzco is a 70 y.o. male.    Chief Complaint: Cough (Lingering cough since last Friday)    History of Present Illness    CHIEF COMPLAINT:  Mr. Verduzco presents today for persistent cough that started last Friday.    HISTORY OF PRESENT ILLNESS:  He developed a persistent cough following choking on a lettuce leaf one week ago.States he did not aspirate the lettuce and that it came out of his mouth. The cough is productive of light yellow phlegm, with significant worsening yesterday accompanied by continuous production. He reports pain in right upper lung region due to excessive coughing. Associated symptoms include low-grade fever of approximately 100°F. COVID-19 test was negative. He has been self-treating with Delsym. He denies sensation of retained foreign body in airway.    ORAL SYMPTOMS:  He reports intermittent swelling under the tongue that began last week. The swelling is noticeable but not painful. He notes a textured sensation on the left inferior aspect of the tongue, possibly from rubbing against the swollen area.    SOCIAL HISTORY:  History of smoking and chewing tobacco use 30 years ago.    ALLERGIES:  No known medication allergies.    Portions of this note were generated with the assistance of ambient listening technology.      ROS:  General: +fever, +chills  ENT: +sore throat, +runny nose, +tongue swelling  Respiratory: +cough, +productive cough, +pain with respiration       Review of patient's allergies indicates:  No Known Allergies  Social Drivers of Health     Tobacco Use: Medium Risk (6/18/2025)    Patient History     Smoking Tobacco Use: Former     Smokeless Tobacco Use: Former     Passive Exposure: Not on file   Alcohol Use: Alcohol Misuse (5/29/2025)    AUDIT-C     Frequency of Alcohol Consumption: 4 or more times a week     Average Number of Drinks: 3 or 4     Frequency of Binge Drinking: Never   Financial Resource Strain: Patient Declined (5/29/2025)     Overall Financial Resource Strain (CARDIA)     Difficulty of Paying Living Expenses: Patient declined   Food Insecurity: Patient Declined (5/29/2025)    Hunger Vital Sign     Worried About Running Out of Food in the Last Year: Patient declined     Ran Out of Food in the Last Year: Patient declined   Transportation Needs: Patient Declined (5/29/2025)    PRAPARE - Transportation     Lack of Transportation (Medical): Patient declined     Lack of Transportation (Non-Medical): Patient declined   Physical Activity: Sufficiently Active (5/29/2025)    Exercise Vital Sign     Days of Exercise per Week: 5 days     Minutes of Exercise per Session: 30 min   Stress: No Stress Concern Present (5/29/2025)    [employer] - Occupational Stress Questionnaire     Feeling of Stress : Not at all   Housing Stability: Patient Declined (5/29/2025)    Housing Stability Vital Sign     Unable to Pay for Housing in the Last Year: Patient declined     Number of Times Moved in the Last Year: Not on file     Homeless in the Last Year: Patient declined   Depression: Low Risk  (6/18/2025)    Depression     Last PHQ-4: Flowsheet Data: 0   Utilities: Patient Declined (5/29/2025)    [facility] Utilities     Threatened with loss of utilities: Patient declined   Health Literacy: Adequate Health Literacy (5/29/2025)     Health Literacy     Frequency of need for help with medical instructions: Never   Social Isolation: Not on file      Past Medical History:   Diagnosis Date    Hypertension     Mixed hyperlipidemia     Prostate cancer     Rosacea       Past Surgical History:   Procedure Laterality Date    ABLATION, PROSTATE, HIGH INTENSITY FOCUSED ULTRASOUND (HIFU)  07/27/2023    patient stated due to being dx with prostate ca    COLONOSCOPY  2019    COLONOSCOPY N/A 05/17/2023    Procedure: COLONOSCOPY;  Surgeon: Gerard Allen MD;  Location: [facility];  Service: Endoscopy;  Laterality: N/A;    PROSTATE BIOPSY N/A 04/25/2023  "   Procedure: BIOPSY, PROSTATE;  Surgeon: Thad Patricio MD;  Location: [facility];  Service: Urology;  Laterality: N/A;    right hand surgery      VASECTOMY  1996      Social History[1]    Current Medications[2]      Objective:      Vitals:    06/18/25 1057   BP: (!) 138/58   Pulse: 85   Temp: 98.5 °F (36.9 °C)   SpO2: 96%   Weight: 82.8 kg (182 lb 9.6 oz)   Height: 5' 6" (1.676 m)      Physical Exam  Constitutional:       General: He is not in acute distress.     Appearance: Normal appearance.   HENT:      Head: Normocephalic and atraumatic.      Right Ear: Tympanic membrane, ear canal and external ear normal.      Left Ear: Tympanic membrane, ear canal and external ear normal.      Mouth/Throat:      Pharynx: No oropharyngeal exudate or posterior oropharyngeal erythema.      Comments: Swelling and erythema of left sublingual salivary gland.   Eyes:      Conjunctiva/sclera: Conjunctivae normal.   Cardiovascular:      Rate and Rhythm: Normal rate and regular rhythm.   Pulmonary:      Effort: Pulmonary effort is normal. No respiratory distress.      Breath sounds: Normal breath sounds. No wheezing.   Abdominal:      General: Bowel sounds are normal. There is no distension.      Palpations: There is no mass.      Tenderness: There is no abdominal tenderness.   Musculoskeletal:      Right lower leg: No edema.      Left lower leg: No edema.   Lymphadenopathy:      Cervical: No cervical adenopathy.   Neurological:      Mental Status: He is alert and oriented to person, place, and time.   Psychiatric:         Behavior: Behavior normal.         Assessment:       1. Aspiration of food, initial encounter    2. Granulomatous lung disease    3. Aortic atherosclerosis    4. Cough, unspecified type    5. Fever, unspecified fever cause        Plan:       Assessment & Plan    IMPRESSION:  - Suspect aspiration pneumonia based on history of choking on lettuce and subsequent cough, fever, and phlegm production.  - Observed swelling " under tongue, likely a ranula, a blocked or inflamed salivary gland.  Portions of this note were generated with the assistance of ambient listening technology.    PLAN SUMMARY:  - Ordered chest XR to evaluate potential pulmonary fluid buildup  - Prescribed antibiotics for potential sublingual and pulmonary infections  - Prescribed Prednisone 1 pill daily for 5 days to help with cough  - Scheduled follow up in 1 week to reassess sublingual swelling and evaluate response to aspiration pneumonia treatment      FOLLOW-UP:  - Scheduled follow-up to reassess sublingual swelling and evaluate response to treatment for aspiration pneumonia.       Phil was seen today for cough.    Diagnoses and all orders for this visit:    Aspiration of food, initial encounter  -     X-Ray Chest PA And Lateral; Future  -     amoxicillin-clavulanate 875-125mg (AUGMENTIN) 875-125 mg per tablet; Take 1 tablet by mouth 2 (two) times daily.    Granulomatous lung disease  -     X-Ray Chest PA And Lateral; Future    Aortic atherosclerosis    Cough, unspecified type  -     X-Ray Chest PA And Lateral; Future  -     predniSONE (DELTASONE) 20 MG tablet; Take 1 tablet (20 mg total) by mouth once daily.    Fever, unspecified fever cause  -     X-Ray Chest PA And Lateral; Future  -     amoxicillin-clavulanate 875-125mg (AUGMENTIN) 875-125 mg per tablet; Take 1 tablet by mouth 2 (two) times daily.               Follow up in about 1 week (around 6/25/2025) for cough.    Future Appointments       Date Provider Specialty Appt Notes    6/25/2025 Prabha Ibarra, FNP-C Family Medicine 1wk    7/11/2025 Frances Martinez MD Dermatology skin check            This note was generated with the assistance of ambient listening technology. Verbal consent was obtained by the patient and accompanying visitor(s) for the recording of patient appointment to facilitate this note. I attest to having reviewed and edited the generated note for accuracy, though some  syntax or spelling errors may persist. Please contact the author of this note for any clarification.      Prabha Ibarra, FNP-C  Family Medicine         [1]   Social History  Socioeconomic History    Marital status:    [2]   Current Outpatient Medications:     ALPRAZolam (XANAX) 0.5 MG tablet, Take 1 tablet (0.5 mg total) by mouth daily as needed for Anxiety., Disp: 30 tablet, Rfl: 0    amLODIPine (NORVASC) 2.5 MG tablet, Take 1 tablet by mouth once daily, Disp: 90 tablet, Rfl: 2    latanoprost 0.005 % ophthalmic solution, Place 1 drop into both eyes every evening., Disp: , Rfl:     lisinopriL (PRINIVIL,ZESTRIL) 40 MG tablet, Take 1 tablet (40 mg total) by mouth once daily., Disp: 90 tablet, Rfl: 1    tadalafiL (CIALIS) 10 MG tablet, Take 1 tablet (10 mg total) by mouth daily as needed for Erectile Dysfunction., Disp: 30 tablet, Rfl: 3    triamcinolone acetonide 0.1% (KENALOG) 0.1 % cream, Apply topically 2 (two) times daily. prn, Disp: 80 g, Rfl: 2    amoxicillin-clavulanate 875-125mg (AUGMENTIN) 875-125 mg per tablet, Take 1 tablet by mouth 2 (two) times daily., Disp: 14 tablet, Rfl: 0    predniSONE (DELTASONE) 20 MG tablet, Take 1 tablet (20 mg total) by mouth once daily., Disp: 5 tablet, Rfl: 0

## 2025-06-25 ENCOUNTER — PATIENT MESSAGE (OUTPATIENT)
Dept: FAMILY MEDICINE | Facility: CLINIC | Age: 70
End: 2025-06-25

## 2025-06-25 ENCOUNTER — OFFICE VISIT (OUTPATIENT)
Dept: FAMILY MEDICINE | Facility: CLINIC | Age: 70
End: 2025-06-25
Payer: MEDICARE

## 2025-06-25 VITALS
OXYGEN SATURATION: 95 % | WEIGHT: 180.31 LBS | HEIGHT: 66 IN | BODY MASS INDEX: 28.98 KG/M2 | SYSTOLIC BLOOD PRESSURE: 112 MMHG | DIASTOLIC BLOOD PRESSURE: 54 MMHG | HEART RATE: 94 BPM | TEMPERATURE: 98 F

## 2025-06-25 DIAGNOSIS — R05.9 COUGH, UNSPECIFIED TYPE: Primary | ICD-10-CM

## 2025-06-25 DIAGNOSIS — J06.9 UPPER RESPIRATORY TRACT INFECTION, UNSPECIFIED TYPE: ICD-10-CM

## 2025-06-25 PROCEDURE — 99214 OFFICE O/P EST MOD 30 MIN: CPT | Mod: S$PBB,,, | Performed by: NURSE PRACTITIONER

## 2025-06-25 PROCEDURE — 99999 PR PBB SHADOW E&M-EST. PATIENT-LVL III: CPT | Mod: PBBFAC,,, | Performed by: NURSE PRACTITIONER

## 2025-06-25 PROCEDURE — 99213 OFFICE O/P EST LOW 20 MIN: CPT | Mod: PBBFAC,PN | Performed by: NURSE PRACTITIONER

## 2025-06-25 NOTE — PROGRESS NOTES
Subjective:       Patient ID: Phil Verduzco is a 70 y.o. male.    Chief Complaint: Follow-up (1 wk)    Follow-up    Mr. Verduzco presents for follow up on cough and possible aspiration pneumonia. One week ago presented to me with new cough, concern for aspiration because he had choked on some lettuce.  Chest xray was normal.  Pt treated for pneumonia with augmentin, given oral prednisone.  Today pt states that symptoms have improved. Still has occasional cough, but feeling better overall.     Pt also had complained of some localized swelling underneath tongue last week.  Today states lesions/swelling is resolved.     Pt has questions about his labs that he completed 1 week ago. B12 >1500 and CBC stable.  Pt stopped his B12 supplement when he saw the high reading. Pt would like Dr. Gould to interpret labs since he was the one who ordered.   Review of patient's allergies indicates:  No Known Allergies  Social Drivers of Health     Tobacco Use: Medium Risk (6/25/2025)    Patient History     Smoking Tobacco Use: Former     Smokeless Tobacco Use: Former     Passive Exposure: Not on file   Alcohol Use: Alcohol Misuse (5/29/2025)    AUDIT-C     Frequency of Alcohol Consumption: 4 or more times a week     Average Number of Drinks: 3 or 4     Frequency of Binge Drinking: Never   Financial Resource Strain: Patient Declined (5/29/2025)    Overall Financial Resource Strain (CARDIA)     Difficulty of Paying Living Expenses: Patient declined   Food Insecurity: Patient Declined (5/29/2025)    Hunger Vital Sign     Worried About Running Out of Food in the Last Year: Patient declined     Ran Out of Food in the Last Year: Patient declined   Transportation Needs: Patient Declined (5/29/2025)    PRAPARE - Transportation     Lack of Transportation (Medical): Patient declined     Lack of Transportation (Non-Medical): Patient declined   Physical Activity: Sufficiently Active (5/29/2025)    Exercise Vital Sign     Days of Exercise per  "Week: 5 days     Minutes of Exercise per Session: 30 min   Stress: No Stress Concern Present (5/29/2025)    [employer] - Occupational Stress Questionnaire     Feeling of Stress : Not at all   Housing Stability: Patient Declined (5/29/2025)    Housing Stability Vital Sign     Unable to Pay for Housing in the Last Year: Patient declined     Number of Times Moved in the Last Year: Not on file     Homeless in the Last Year: Patient declined   Depression: Low Risk  (6/25/2025)    Depression     Last PHQ-4: Flowsheet Data: 0   Utilities: Patient Declined (5/29/2025)    [facility] Utilities     Threatened with loss of utilities: Patient declined   Health Literacy: Adequate Health Literacy (5/29/2025)     Health Literacy     Frequency of need for help with medical instructions: Never   Social Isolation: Not on file      Past Medical History:   Diagnosis Date    Hypertension     Mixed hyperlipidemia     Prostate cancer     Rosacea       Past Surgical History:   Procedure Laterality Date    ABLATION, PROSTATE, HIGH INTENSITY FOCUSED ULTRASOUND (HIFU)  07/27/2023    patient stated due to being dx with prostate ca    COLONOSCOPY  2019    COLONOSCOPY N/A 05/17/2023    Procedure: COLONOSCOPY;  Surgeon: Gerard Allen MD;  Location: [facility];  Service: Endoscopy;  Laterality: N/A;    PROSTATE BIOPSY N/A 04/25/2023    Procedure: BIOPSY, PROSTATE;  Surgeon: Thad Patricio MD;  Location: [facility];  Service: Urology;  Laterality: N/A;    right hand surgery      VASECTOMY  1996      Social History[1]    Current Medications[2]    Review of Systems   All other systems reviewed and are negative.      Objective:      Vitals:    06/25/25 1014   BP: (!) 112/54   Pulse: 94   Temp: 98.3 °F (36.8 °C)   SpO2: 95%   Weight: 81.8 kg (180 lb 4.8 oz)   Height: 5' 6" (1.676 m)      Physical Exam  Constitutional:       Appearance: Normal appearance.   HENT:      Head: Normocephalic and atraumatic.      Mouth/Throat: "      Mouth: Mucous membranes are moist.      Tongue: No lesions.      Comments: Previous swelling under tongue, resolved.   Eyes:      Conjunctiva/sclera: Conjunctivae normal.   Cardiovascular:      Rate and Rhythm: Normal rate and regular rhythm.      Heart sounds: Normal heart sounds, S1 normal and S2 normal.   Pulmonary:      Effort: Pulmonary effort is normal. No respiratory distress.      Breath sounds: Normal breath sounds. No wheezing, rhonchi or rales.   Musculoskeletal:      Right lower leg: No edema.      Left lower leg: No edema.   Skin:     General: Skin is warm.   Neurological:      Mental Status: He is alert and oriented to person, place, and time.   Psychiatric:         Mood and Affect: Mood normal.         Behavior: Behavior normal.         Thought Content: Thought content normal.         Judgment: Judgment normal.         Assessment:       1. Cough, unspecified type    2. Upper respiratory tract infection, unspecified type        Plan:       Phil was seen today for follow-up.    Diagnoses and all orders for this visit:    Cough, unspecified type    Upper respiratory tract infection, unspecified type     -viral infection. Offered cough suppressant, pt declines. Cough is improving.     -will discuss with dr. Gould about lab results and message patient in the portal.                    [1]   Social History  Socioeconomic History    Marital status:    [2]   Current Outpatient Medications:     ALPRAZolam (XANAX) 0.5 MG tablet, Take 1 tablet (0.5 mg total) by mouth daily as needed for Anxiety., Disp: 30 tablet, Rfl: 0    amLODIPine (NORVASC) 2.5 MG tablet, Take 1 tablet by mouth once daily, Disp: 90 tablet, Rfl: 2    latanoprost 0.005 % ophthalmic solution, Place 1 drop into both eyes every evening., Disp: , Rfl:     lisinopriL (PRINIVIL,ZESTRIL) 40 MG tablet, Take 1 tablet (40 mg total) by mouth once daily., Disp: 90 tablet, Rfl: 1    tadalafiL (CIALIS) 10 MG tablet, Take 1 tablet (10 mg total)  by mouth daily as needed for Erectile Dysfunction., Disp: 30 tablet, Rfl: 3    triamcinolone acetonide 0.1% (KENALOG) 0.1 % cream, Apply topically 2 (two) times daily. prn, Disp: 80 g, Rfl: 2    amoxicillin-clavulanate 875-125mg (AUGMENTIN) 875-125 mg per tablet, Take 1 tablet by mouth 2 (two) times daily. (Patient not taking: Reported on 6/25/2025), Disp: 14 tablet, Rfl: 0    predniSONE (DELTASONE) 20 MG tablet, Take 1 tablet (20 mg total) by mouth once daily. (Patient not taking: Reported on 6/25/2025), Disp: 5 tablet, Rfl: 0

## 2025-07-08 ENCOUNTER — TELEPHONE (OUTPATIENT)
Dept: FAMILY MEDICINE | Facility: CLINIC | Age: 70
End: 2025-07-08
Payer: MEDICARE

## 2025-07-08 NOTE — TELEPHONE ENCOUNTER
----- Message from Romeo Gould MD sent at 6/18/2025  6:13 PM CDT -----  NORMAL followup as needed.  ----- Message -----  From: Interface, Rad Results In  Sent: 6/18/2025  11:45 AM CDT  To: Romeo Gould III, MD

## 2025-07-08 NOTE — TELEPHONE ENCOUNTER
----- Message from LU Lyons sent at 6/18/2025 12:18 PM CDT -----  Chest xray is normal. Please follow up as previously discussed.   ----- Message -----  From: Maisha Rad Results In  Sent: 6/18/2025  11:45 AM CDT  To: LU Rahman

## 2025-07-11 ENCOUNTER — PATIENT MESSAGE (OUTPATIENT)
Dept: FAMILY MEDICINE | Facility: CLINIC | Age: 70
End: 2025-07-11
Payer: MEDICARE

## 2025-07-11 ENCOUNTER — OFFICE VISIT (OUTPATIENT)
Dept: DERMATOLOGY | Facility: CLINIC | Age: 70
End: 2025-07-11
Payer: MEDICARE

## 2025-07-11 DIAGNOSIS — L57.8 ACTINIC SKIN DAMAGE: ICD-10-CM

## 2025-07-11 DIAGNOSIS — D18.01 CHERRY ANGIOMA: ICD-10-CM

## 2025-07-11 DIAGNOSIS — L82.1 SEBORRHEIC KERATOSES: ICD-10-CM

## 2025-07-11 DIAGNOSIS — Z08 ENCOUNTER FOR FOLLOW-UP SURVEILLANCE OF SKIN CANCER: Primary | ICD-10-CM

## 2025-07-11 DIAGNOSIS — Z85.828 ENCOUNTER FOR FOLLOW-UP SURVEILLANCE OF SKIN CANCER: Primary | ICD-10-CM

## 2025-07-11 DIAGNOSIS — L81.4 SOLAR LENTIGO: ICD-10-CM

## 2025-07-11 NOTE — PROGRESS NOTES
Subjective:      Patient ID:  Phil Verduzco is a 70 y.o. male who presents for   Chief Complaint   Patient presents with    Follow-up     LOV 5/30/25    Patient here today for skin check TBSE   Patient states he has no new spots of concern     Derm Hx:  + desmoplastic adnexal tumor, right paramedian forehead - mohs Dr. K 10/2023  No Phx NMSC  No Fhx MM       Current Outpatient Medications:   ·  ALPRAZolam (XANAX) 0.5 MG tablet, Take 1 tablet (0.5 mg total) by mouth daily as needed for Anxiety., Disp: 30 tablet, Rfl: 0  ·  amLODIPine (NORVASC) 2.5 MG tablet, Take 1 tablet by mouth once daily, Disp: 90 tablet, Rfl: 2  ·  latanoprost 0.005 % ophthalmic solution, Place 1 drop into both eyes every evening., Disp: , Rfl:   ·  lisinopriL (PRINIVIL,ZESTRIL) 40 MG tablet, Take 1 tablet (40 mg total) by mouth once daily., Disp: 90 tablet, Rfl: 1  ·  tadalafiL (CIALIS) 10 MG tablet, Take 1 tablet (10 mg total) by mouth daily as needed for Erectile Dysfunction., Disp: 30 tablet, Rfl: 3  ·  triamcinolone acetonide 0.1% (KENALOG) 0.1 % cream, Apply topically 2 (two) times daily. prn, Disp: 80 g, Rfl: 2  ·  amoxicillin-clavulanate 875-125mg (AUGMENTIN) 875-125 mg per tablet, Take 1 tablet by mouth 2 (two) times daily. (Patient not taking: Reported on 6/25/2025), Disp: 14 tablet, Rfl: 0  ·  predniSONE (DELTASONE) 20 MG tablet, Take 1 tablet (20 mg total) by mouth once daily. (Patient not taking: Reported on 6/25/2025), Disp: 5 tablet, Rfl: 0         Review of Systems   Constitutional:  Negative for fever, chills and fatigue.   Respiratory:  Negative for cough and shortness of breath.    Skin:  Positive for activity-related sunscreen use and wears hat. Negative for itching, rash, dry skin and daily sunscreen use.   Hematologic/Lymphatic: Does not bruise/bleed easily.       Objective:   Physical Exam   Constitutional: He appears well-developed and well-nourished. No distress.   Neurological: He is alert and oriented to person,  place, and time. He is not disoriented.   Psychiatric: He has a normal mood and affect.   Skin:   Areas Examined (abnormalities noted in diagram):   Scalp / Hair Palpated and Inspected  Head / Face Inspection Performed  Neck Inspection Performed  Chest / Axilla Inspection Performed  Abdomen Inspection Performed  Genitals / Buttocks / Groin Inspection Performed  Back Inspection Performed  RUE Inspected  LUE Inspection Performed  RLE Inspected  LLE Inspection Performed  Nails and Digits Inspection Performed                 Diagram Legend     Erythematous scaling macule/papule c/w actinic keratosis       Vascular papule c/w angioma      Pigmented verrucoid papule/plaque c/w seborrheic keratosis      Yellow umbilicated papule c/w sebaceous hyperplasia      Irregularly shaped tan macule c/w lentigo     1-2 mm smooth white papules consistent with Milia      Movable subcutaneous cyst with punctum c/w epidermal inclusion cyst      Subcutaneous movable cyst c/w pilar cyst      Firm pink to brown papule c/w dermatofibroma      Pedunculated fleshy papule(s) c/w skin tag(s)      Evenly pigmented macule c/w junctional nevus     Mildly variegated pigmented, slightly irregular-bordered macule c/w mildly atypical nevus      Flesh colored to evenly pigmented papule c/w intradermal nevus       Pink pearly papule/plaque c/w basal cell carcinoma      Erythematous hyperkeratotic cursted plaque c/w SCC      Surgical scar with no sign of skin cancer recurrence      Open and closed comedones      Inflammatory papules and pustules      Verrucoid papule consistent consistent with wart     Erythematous eczematous patches and plaques     Dystrophic onycholytic nail with subungual debris c/w onychomycosis     Umbilicated papule    Erythematous-base heme-crusted tan verrucoid plaque consistent with inflamed seborrheic keratosis     Erythematous Silvery Scaling Plaque c/w Psoriasis     See annotation      Assessment / Plan:        Encounter for  follow-up surveillance of skin cancer  Area of previous desmoplastic trichoepithelioma examined. Site well healed with no signs of recurrence.    Total body skin examination performed today including at least 12 points as noted in physical examination. No lesions suspicious for malignancy noted.    Seborrheic keratoses  These are benign inherited growths without a malignant potential. Reassurance given to patient. No treatment is necessary.     Solar lentigo  This is a benign hyperpigmented sun induced lesion. Daily sun protection will reduce the number of new lesions. Treatment of these benign lesions are considered cosmetic.    Cherry angioma  This is a benign vascular lesion. Reassurance given. No treatment required.     Actinic skin damage  Patient instructed in importance in daily broad spectrum sun protection of at least spf 30. Mineral sunscreen ingredients preferred (Zinc +/- Titanium) and can be found OTC.   Patient encouraged to wear hat for all outdoor exposure.   Also discussed sun avoidance and use of protective clothing.             No follow-ups on file.

## 2025-08-25 DIAGNOSIS — Z00.00 ENCOUNTER FOR MEDICARE ANNUAL WELLNESS EXAM: ICD-10-CM
